# Patient Record
Sex: FEMALE | Race: WHITE | NOT HISPANIC OR LATINO | Employment: FULL TIME | URBAN - METROPOLITAN AREA
[De-identification: names, ages, dates, MRNs, and addresses within clinical notes are randomized per-mention and may not be internally consistent; named-entity substitution may affect disease eponyms.]

---

## 2017-04-05 ENCOUNTER — OFFICE VISIT - HEALTHEAST (OUTPATIENT)
Dept: FAMILY MEDICINE | Facility: CLINIC | Age: 37
End: 2017-04-05

## 2017-04-05 DIAGNOSIS — Z00.00 ROUTINE GENERAL MEDICAL EXAMINATION AT A HEALTH CARE FACILITY: ICD-10-CM

## 2017-04-05 DIAGNOSIS — M54.9 BACK PAIN: ICD-10-CM

## 2017-04-05 DIAGNOSIS — D48.9 NEOPLASM OF UNCERTAIN BEHAVIOR: ICD-10-CM

## 2017-04-05 LAB
CHOLEST SERPL-MCNC: 194 MG/DL
FASTING STATUS PATIENT QL REPORTED: YES
HDLC SERPL-MCNC: 44 MG/DL
LDLC SERPL CALC-MCNC: 126 MG/DL
TRIGL SERPL-MCNC: 122 MG/DL

## 2017-04-05 ASSESSMENT — MIFFLIN-ST. JEOR: SCORE: 1528.15

## 2017-04-06 ENCOUNTER — COMMUNICATION - HEALTHEAST (OUTPATIENT)
Dept: FAMILY MEDICINE | Facility: CLINIC | Age: 37
End: 2017-04-06

## 2017-04-10 ENCOUNTER — RECORDS - HEALTHEAST (OUTPATIENT)
Dept: ADMINISTRATIVE | Facility: OTHER | Age: 37
End: 2017-04-10

## 2017-04-10 ENCOUNTER — COMMUNICATION - HEALTHEAST (OUTPATIENT)
Dept: FAMILY MEDICINE | Facility: CLINIC | Age: 37
End: 2017-04-10

## 2017-04-10 LAB
HPV INTERPRETATION - HISTORICAL: ABNORMAL
HPV INTERPRETER - HISTORICAL: ABNORMAL

## 2017-04-12 ENCOUNTER — COMMUNICATION - HEALTHEAST (OUTPATIENT)
Dept: FAMILY MEDICINE | Facility: CLINIC | Age: 37
End: 2017-04-12

## 2017-04-12 DIAGNOSIS — R87.619 ABNORMAL PAP SMEAR OF CERVIX: ICD-10-CM

## 2017-04-12 LAB
BKR LAB AP ABNORMAL BLEEDING: NO
BKR LAB AP BIRTH CONTROL/HORMONES: ABNORMAL
BKR LAB AP CERVICAL APPEARANCE: NORMAL
BKR LAB AP GYN ADEQUACY: ABNORMAL
BKR LAB AP GYN INTERPRETATION: ABNORMAL
BKR LAB AP HPV REFLEX: ABNORMAL
BKR LAB AP LMP: ABNORMAL
BKR LAB AP PATIENT STATUS: ABNORMAL
BKR LAB AP PREVIOUS ABNORMAL: ABNORMAL
BKR LAB AP PREVIOUS NORMAL: ABNORMAL
HIGH RISK?: NO
INTERPRETING LAB: ABNORMAL
PATH REPORT.COMMENTS IMP SPEC: ABNORMAL
RESULT FLAG (HE HISTORICAL CONVERSION): ABNORMAL

## 2017-04-25 ENCOUNTER — RECORDS - HEALTHEAST (OUTPATIENT)
Dept: ADMINISTRATIVE | Facility: OTHER | Age: 37
End: 2017-04-25

## 2017-07-05 ENCOUNTER — COMMUNICATION - HEALTHEAST (OUTPATIENT)
Dept: FAMILY MEDICINE | Facility: CLINIC | Age: 37
End: 2017-07-05

## 2017-07-13 ENCOUNTER — OFFICE VISIT - HEALTHEAST (OUTPATIENT)
Dept: FAMILY MEDICINE | Facility: CLINIC | Age: 37
End: 2017-07-13

## 2017-07-13 DIAGNOSIS — M54.9 BACK PAIN: ICD-10-CM

## 2017-07-13 DIAGNOSIS — E66.3 OVERWEIGHT (BMI 25.0-29.9): ICD-10-CM

## 2017-07-13 DIAGNOSIS — F33.9 MAJOR DEPRESSIVE DISORDER, RECURRENT EPISODE (H): ICD-10-CM

## 2017-07-13 DIAGNOSIS — M25.551 HIP PAIN, ACUTE, RIGHT: ICD-10-CM

## 2017-07-13 DIAGNOSIS — Z72.0 TOBACCO ABUSE: ICD-10-CM

## 2017-07-13 RX ORDER — LORATADINE 10 MG/1
10 TABLET ORAL DAILY PRN
Status: SHIPPED | COMMUNITY
Start: 2010-05-18 | End: 2022-02-25

## 2017-07-13 ASSESSMENT — MIFFLIN-ST. JEOR: SCORE: 1531.32

## 2017-07-18 ENCOUNTER — OFFICE VISIT - HEALTHEAST (OUTPATIENT)
Dept: PHYSICAL THERAPY | Facility: REHABILITATION | Age: 37
End: 2017-07-18

## 2017-07-18 DIAGNOSIS — G89.29 CHRONIC RIGHT-SIDED LOW BACK PAIN WITHOUT SCIATICA: ICD-10-CM

## 2017-07-18 DIAGNOSIS — M62.81 GENERALIZED MUSCLE WEAKNESS: ICD-10-CM

## 2017-07-18 DIAGNOSIS — M25.551 PAIN OF RIGHT HIP JOINT: ICD-10-CM

## 2017-07-18 DIAGNOSIS — M54.50 CHRONIC RIGHT-SIDED LOW BACK PAIN WITHOUT SCIATICA: ICD-10-CM

## 2017-07-24 ENCOUNTER — OFFICE VISIT - HEALTHEAST (OUTPATIENT)
Dept: PHYSICAL THERAPY | Facility: REHABILITATION | Age: 37
End: 2017-07-24

## 2017-07-24 DIAGNOSIS — G89.29 CHRONIC RIGHT-SIDED LOW BACK PAIN WITHOUT SCIATICA: ICD-10-CM

## 2017-07-24 DIAGNOSIS — M54.50 CHRONIC RIGHT-SIDED LOW BACK PAIN WITHOUT SCIATICA: ICD-10-CM

## 2017-07-24 DIAGNOSIS — M62.81 GENERALIZED MUSCLE WEAKNESS: ICD-10-CM

## 2017-07-24 DIAGNOSIS — M25.551 PAIN OF RIGHT HIP JOINT: ICD-10-CM

## 2017-07-28 ENCOUNTER — OFFICE VISIT - HEALTHEAST (OUTPATIENT)
Dept: PHYSICAL THERAPY | Facility: REHABILITATION | Age: 37
End: 2017-07-28

## 2017-07-28 DIAGNOSIS — M62.81 GENERALIZED MUSCLE WEAKNESS: ICD-10-CM

## 2017-07-28 DIAGNOSIS — M25.551 PAIN OF RIGHT HIP JOINT: ICD-10-CM

## 2017-07-28 DIAGNOSIS — M54.50 CHRONIC RIGHT-SIDED LOW BACK PAIN WITHOUT SCIATICA: ICD-10-CM

## 2017-07-28 DIAGNOSIS — G89.29 CHRONIC RIGHT-SIDED LOW BACK PAIN WITHOUT SCIATICA: ICD-10-CM

## 2017-07-31 ENCOUNTER — OFFICE VISIT - HEALTHEAST (OUTPATIENT)
Dept: PHYSICAL THERAPY | Facility: REHABILITATION | Age: 37
End: 2017-07-31

## 2017-07-31 DIAGNOSIS — M25.551 PAIN OF RIGHT HIP JOINT: ICD-10-CM

## 2017-07-31 DIAGNOSIS — M62.81 GENERALIZED MUSCLE WEAKNESS: ICD-10-CM

## 2017-07-31 DIAGNOSIS — M54.50 CHRONIC RIGHT-SIDED LOW BACK PAIN WITHOUT SCIATICA: ICD-10-CM

## 2017-07-31 DIAGNOSIS — G89.29 CHRONIC RIGHT-SIDED LOW BACK PAIN WITHOUT SCIATICA: ICD-10-CM

## 2017-09-29 ENCOUNTER — OFFICE VISIT - HEALTHEAST (OUTPATIENT)
Dept: FAMILY MEDICINE | Facility: CLINIC | Age: 37
End: 2017-09-29

## 2017-09-29 DIAGNOSIS — J40 BRONCHITIS: ICD-10-CM

## 2017-09-29 DIAGNOSIS — R05.9 COUGH: ICD-10-CM

## 2018-03-01 ENCOUNTER — COMMUNICATION - HEALTHEAST (OUTPATIENT)
Dept: FAMILY MEDICINE | Facility: CLINIC | Age: 38
End: 2018-03-01

## 2018-03-01 DIAGNOSIS — F33.9 MAJOR DEPRESSIVE DISORDER, RECURRENT EPISODE (H): ICD-10-CM

## 2018-05-11 ENCOUNTER — OFFICE VISIT - HEALTHEAST (OUTPATIENT)
Dept: FAMILY MEDICINE | Facility: CLINIC | Age: 38
End: 2018-05-11

## 2018-05-11 DIAGNOSIS — F33.9 MAJOR DEPRESSIVE DISORDER, RECURRENT EPISODE (H): ICD-10-CM

## 2018-05-11 DIAGNOSIS — M54.9 BACK PAIN: ICD-10-CM

## 2018-05-11 DIAGNOSIS — J30.9 ALLERGIC RHINITIS: ICD-10-CM

## 2018-05-11 DIAGNOSIS — Z23 NEED FOR HEPATITIS B VACCINATION: ICD-10-CM

## 2018-05-11 DIAGNOSIS — Z00.00 ROUTINE GENERAL MEDICAL EXAMINATION AT A HEALTH CARE FACILITY: ICD-10-CM

## 2018-05-11 DIAGNOSIS — Z23 NEED FOR HEPATITIS A VACCINATION: ICD-10-CM

## 2018-05-11 DIAGNOSIS — F41.1 ANXIETY STATE: ICD-10-CM

## 2018-05-11 DIAGNOSIS — Z23 NEED FOR TDAP VACCINATION: ICD-10-CM

## 2018-05-11 LAB
ALBUMIN UR-MCNC: NEGATIVE MG/DL
APPEARANCE UR: CLEAR
BILIRUB UR QL STRIP: NEGATIVE
CHOLEST SERPL-MCNC: 195 MG/DL
COLOR UR AUTO: YELLOW
FASTING STATUS PATIENT QL REPORTED: NO
GLUCOSE UR STRIP-MCNC: NEGATIVE MG/DL
HDLC SERPL-MCNC: 45 MG/DL
HGB BLD-MCNC: 12.7 G/DL (ref 12–16)
HGB UR QL STRIP: NEGATIVE
KETONES UR STRIP-MCNC: NEGATIVE MG/DL
LDLC SERPL CALC-MCNC: 117 MG/DL
LEUKOCYTE ESTERASE UR QL STRIP: NEGATIVE
NITRATE UR QL: NEGATIVE
PH UR STRIP: 7 [PH] (ref 5–8)
SP GR UR STRIP: 1.02 (ref 1–1.03)
TRIGL SERPL-MCNC: 165 MG/DL
UROBILINOGEN UR STRIP-ACNC: NORMAL

## 2018-05-11 ASSESSMENT — MIFFLIN-ST. JEOR: SCORE: 1508.64

## 2018-05-14 ENCOUNTER — COMMUNICATION - HEALTHEAST (OUTPATIENT)
Dept: FAMILY MEDICINE | Facility: CLINIC | Age: 38
End: 2018-05-14

## 2018-05-25 ENCOUNTER — HOSPITAL ENCOUNTER (OUTPATIENT)
Dept: MAMMOGRAPHY | Facility: CLINIC | Age: 38
Discharge: HOME | End: 2018-05-25
Attending: FAMILY MEDICINE

## 2018-05-25 ENCOUNTER — COMMUNICATION - HEALTHEAST (OUTPATIENT)
Dept: TELEHEALTH | Facility: CLINIC | Age: 38
End: 2018-05-25

## 2018-05-25 DIAGNOSIS — Z12.31 VISIT FOR SCREENING MAMMOGRAM: ICD-10-CM

## 2018-07-09 ENCOUNTER — AMBULATORY - HEALTHEAST (OUTPATIENT)
Dept: FAMILY MEDICINE | Facility: CLINIC | Age: 38
End: 2018-07-09

## 2018-07-09 DIAGNOSIS — Z23 NEED FOR HEPATITIS B VACCINATION: ICD-10-CM

## 2018-07-10 ENCOUNTER — AMBULATORY - HEALTHEAST (OUTPATIENT)
Dept: NURSING | Facility: CLINIC | Age: 38
End: 2018-07-10

## 2018-07-10 DIAGNOSIS — Z23 NEED FOR HEPATITIS B VACCINATION: ICD-10-CM

## 2018-07-20 ENCOUNTER — COMMUNICATION - HEALTHEAST (OUTPATIENT)
Dept: FAMILY MEDICINE | Facility: CLINIC | Age: 38
End: 2018-07-20

## 2018-07-20 DIAGNOSIS — F33.9 MAJOR DEPRESSIVE DISORDER, RECURRENT EPISODE (H): ICD-10-CM

## 2018-11-30 ENCOUNTER — OFFICE VISIT - HEALTHEAST (OUTPATIENT)
Dept: FAMILY MEDICINE | Facility: CLINIC | Age: 38
End: 2018-11-30

## 2018-11-30 DIAGNOSIS — Z23 NEEDS FLU SHOT: ICD-10-CM

## 2018-11-30 DIAGNOSIS — F33.1 MODERATE EPISODE OF RECURRENT MAJOR DEPRESSIVE DISORDER (H): ICD-10-CM

## 2018-11-30 DIAGNOSIS — Z23 NEED FOR HEPATITIS B VACCINATION: ICD-10-CM

## 2018-11-30 DIAGNOSIS — Z23 NEED FOR HEPATITIS A IMMUNIZATION: ICD-10-CM

## 2019-01-04 ENCOUNTER — OFFICE VISIT - HEALTHEAST (OUTPATIENT)
Dept: FAMILY MEDICINE | Facility: CLINIC | Age: 39
End: 2019-01-04

## 2019-01-04 ENCOUNTER — HOSPITAL ENCOUNTER (OUTPATIENT)
Dept: LAB | Age: 39
Setting detail: SPECIMEN
Discharge: HOME | End: 2019-01-04

## 2019-01-04 DIAGNOSIS — N39.0 URINARY TRACT INFECTION: ICD-10-CM

## 2019-01-04 LAB
ALBUMIN UR-MCNC: ABNORMAL MG/DL
AMORPH CRY #/AREA URNS HPF: ABNORMAL /[HPF]
APPEARANCE UR: ABNORMAL
BACTERIA #/AREA URNS HPF: ABNORMAL HPF
BILIRUB UR QL STRIP: NEGATIVE
COLOR UR AUTO: YELLOW
GLUCOSE UR STRIP-MCNC: NEGATIVE MG/DL
HGB UR QL STRIP: NEGATIVE
KETONES UR STRIP-MCNC: NEGATIVE MG/DL
LEUKOCYTE ESTERASE UR QL STRIP: NEGATIVE
MUCOUS THREADS #/AREA URNS LPF: ABNORMAL LPF
NITRATE UR QL: NEGATIVE
PH UR STRIP: 5.5 [PH] (ref 4.5–8)
RBC #/AREA URNS AUTO: ABNORMAL HPF
SP GR UR STRIP: 1.02 (ref 1–1.03)
SQUAMOUS #/AREA URNS AUTO: ABNORMAL LPF
UROBILINOGEN UR STRIP-ACNC: ABNORMAL
WBC #/AREA URNS AUTO: ABNORMAL HPF

## 2019-01-06 LAB — BACTERIA SPEC CULT: ABNORMAL

## 2019-05-24 ENCOUNTER — COMMUNICATION - HEALTHEAST (OUTPATIENT)
Dept: FAMILY MEDICINE | Facility: CLINIC | Age: 39
End: 2019-05-24

## 2019-05-24 DIAGNOSIS — J30.2 SEASONAL ALLERGIC RHINITIS, UNSPECIFIED TRIGGER: ICD-10-CM

## 2019-05-31 ENCOUNTER — OFFICE VISIT - HEALTHEAST (OUTPATIENT)
Dept: FAMILY MEDICINE | Facility: CLINIC | Age: 39
End: 2019-05-31

## 2019-05-31 DIAGNOSIS — F41.1 ANXIETY STATE: ICD-10-CM

## 2019-05-31 DIAGNOSIS — J30.2 SEASONAL ALLERGIC RHINITIS, UNSPECIFIED TRIGGER: ICD-10-CM

## 2019-05-31 DIAGNOSIS — F33.1 MODERATE EPISODE OF RECURRENT MAJOR DEPRESSIVE DISORDER (H): ICD-10-CM

## 2019-05-31 ASSESSMENT — MIFFLIN-ST. JEOR: SCORE: 1526.78

## 2019-08-09 ENCOUNTER — COMMUNICATION - HEALTHEAST (OUTPATIENT)
Dept: FAMILY MEDICINE | Facility: CLINIC | Age: 39
End: 2019-08-09

## 2019-08-09 DIAGNOSIS — J30.2 SEASONAL ALLERGIC RHINITIS, UNSPECIFIED TRIGGER: ICD-10-CM

## 2019-08-17 ENCOUNTER — OFFICE VISIT - HEALTHEAST (OUTPATIENT)
Dept: FAMILY MEDICINE | Facility: CLINIC | Age: 39
End: 2019-08-17

## 2019-08-17 DIAGNOSIS — R06.2 WHEEZING: ICD-10-CM

## 2019-08-17 DIAGNOSIS — H92.01 RIGHT EAR PAIN: ICD-10-CM

## 2019-08-17 DIAGNOSIS — H61.23 BILATERAL IMPACTED CERUMEN: ICD-10-CM

## 2019-11-24 ENCOUNTER — COMMUNICATION - HEALTHEAST (OUTPATIENT)
Dept: FAMILY MEDICINE | Facility: CLINIC | Age: 39
End: 2019-11-24

## 2019-11-24 DIAGNOSIS — F33.1 MODERATE EPISODE OF RECURRENT MAJOR DEPRESSIVE DISORDER (H): ICD-10-CM

## 2019-11-24 DIAGNOSIS — F41.1 ANXIETY STATE: ICD-10-CM

## 2020-05-12 ENCOUNTER — COMMUNICATION - HEALTHEAST (OUTPATIENT)
Dept: FAMILY MEDICINE | Facility: CLINIC | Age: 40
End: 2020-05-12

## 2020-05-12 DIAGNOSIS — F33.1 MODERATE EPISODE OF RECURRENT MAJOR DEPRESSIVE DISORDER (H): ICD-10-CM

## 2020-05-12 DIAGNOSIS — F41.1 ANXIETY STATE: ICD-10-CM

## 2020-05-14 ENCOUNTER — COMMUNICATION - HEALTHEAST (OUTPATIENT)
Dept: FAMILY MEDICINE | Facility: CLINIC | Age: 40
End: 2020-05-14

## 2020-05-14 DIAGNOSIS — J30.2 SEASONAL ALLERGIC RHINITIS, UNSPECIFIED TRIGGER: ICD-10-CM

## 2020-06-05 ENCOUNTER — OFFICE VISIT - HEALTHEAST (OUTPATIENT)
Dept: FAMILY MEDICINE | Facility: CLINIC | Age: 40
End: 2020-06-05

## 2020-06-05 DIAGNOSIS — F33.1 MODERATE EPISODE OF RECURRENT MAJOR DEPRESSIVE DISORDER (H): ICD-10-CM

## 2020-06-05 DIAGNOSIS — F41.1 ANXIETY STATE: ICD-10-CM

## 2020-06-05 ASSESSMENT — ANXIETY QUESTIONNAIRES
3. WORRYING TOO MUCH ABOUT DIFFERENT THINGS: NEARLY EVERY DAY
7. FEELING AFRAID AS IF SOMETHING AWFUL MIGHT HAPPEN: NEARLY EVERY DAY
IF YOU CHECKED OFF ANY PROBLEMS ON THIS QUESTIONNAIRE, HOW DIFFICULT HAVE THESE PROBLEMS MADE IT FOR YOU TO DO YOUR WORK, TAKE CARE OF THINGS AT HOME, OR GET ALONG WITH OTHER PEOPLE: SOMEWHAT DIFFICULT
1. FEELING NERVOUS, ANXIOUS, OR ON EDGE: SEVERAL DAYS
5. BEING SO RESTLESS THAT IT IS HARD TO SIT STILL: NEARLY EVERY DAY
2. NOT BEING ABLE TO STOP OR CONTROL WORRYING: NEARLY EVERY DAY
GAD7 TOTAL SCORE: 19
6. BECOMING EASILY ANNOYED OR IRRITABLE: NEARLY EVERY DAY
4. TROUBLE RELAXING: NEARLY EVERY DAY

## 2020-06-05 ASSESSMENT — PATIENT HEALTH QUESTIONNAIRE - PHQ9: SUM OF ALL RESPONSES TO PHQ QUESTIONS 1-9: 9

## 2020-08-10 ENCOUNTER — COMMUNICATION - HEALTHEAST (OUTPATIENT)
Dept: FAMILY MEDICINE | Facility: CLINIC | Age: 40
End: 2020-08-10

## 2020-08-10 DIAGNOSIS — F33.1 MODERATE EPISODE OF RECURRENT MAJOR DEPRESSIVE DISORDER (H): ICD-10-CM

## 2020-08-10 DIAGNOSIS — F41.1 ANXIETY STATE: ICD-10-CM

## 2020-12-22 ENCOUNTER — COMMUNICATION - HEALTHEAST (OUTPATIENT)
Dept: FAMILY MEDICINE | Facility: CLINIC | Age: 40
End: 2020-12-22

## 2020-12-22 DIAGNOSIS — J30.2 SEASONAL ALLERGIC RHINITIS, UNSPECIFIED TRIGGER: ICD-10-CM

## 2020-12-25 RX ORDER — ALBUTEROL SULFATE 90 UG/1
1-2 AEROSOL, METERED RESPIRATORY (INHALATION) EVERY 6 HOURS PRN
Qty: 1 INHALER | Refills: 2 | Status: SHIPPED | OUTPATIENT
Start: 2020-12-25 | End: 2022-02-25

## 2021-01-21 ENCOUNTER — COMMUNICATION - HEALTHEAST (OUTPATIENT)
Dept: FAMILY MEDICINE | Facility: CLINIC | Age: 41
End: 2021-01-21

## 2021-01-21 DIAGNOSIS — F33.1 MODERATE EPISODE OF RECURRENT MAJOR DEPRESSIVE DISORDER (H): ICD-10-CM

## 2021-01-21 DIAGNOSIS — F41.1 ANXIETY STATE: ICD-10-CM

## 2021-05-25 ENCOUNTER — RECORDS - HEALTHEAST (OUTPATIENT)
Dept: ADMINISTRATIVE | Facility: CLINIC | Age: 41
End: 2021-05-25

## 2021-05-26 ASSESSMENT — PATIENT HEALTH QUESTIONNAIRE - PHQ9: SUM OF ALL RESPONSES TO PHQ QUESTIONS 1-9: 9

## 2021-05-28 ASSESSMENT — ANXIETY QUESTIONNAIRES: GAD7 TOTAL SCORE: 19

## 2021-05-29 ENCOUNTER — HEALTH MAINTENANCE LETTER (OUTPATIENT)
Age: 41
End: 2021-05-29

## 2021-05-29 NOTE — TELEPHONE ENCOUNTER
Prescription sent to pharmacy electronically. Please call and let patient know this should be available for  at the pharmacy.

## 2021-05-29 NOTE — TELEPHONE ENCOUNTER
Medication Request  Medication name:   albuterol (PROVENTIL HFA;VENTOLIN HFA) 90 mcg/actuation inhaler (Discontinued) 1 Inhaler 0 7/15/2016 5/11/2018 No   Sig - Route: Inhale 2 puffs every 6 (six) hours as needed for wheezing. Every 4 to 6 hours as needed. - Inhalation   Reason for Discontinue: Therapy completed   E-Prescribing Status: Receipt confirmed by pharmacy (7/15/2016  3:08 PM CDT)       [facility] [address] [place] [address] [address] AT [facility] [address]  Reason for request: Patient is out of this medication and was scheduled with Marissa Rico MD on 5/21/19 but this was rescheduled until next Friday. Patient stated she is having breathing issues due to allergies.   When did you use medication last?:  Unsure needs it mainly in allergy season.  Patient offered appointment:  Patient is scheduled on Friday was scheduled on 5/21/19 but provider had to reschedule  Okay to leave a detailed message: yes
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
## 2021-05-30 VITALS — BODY MASS INDEX: 29.08 KG/M2 | WEIGHT: 185.3 LBS | HEIGHT: 67 IN

## 2021-05-31 VITALS — WEIGHT: 175.06 LBS | BODY MASS INDEX: 27.42 KG/M2

## 2021-05-31 VITALS — HEIGHT: 67 IN | WEIGHT: 186 LBS | BODY MASS INDEX: 29.19 KG/M2

## 2021-05-31 NOTE — PROGRESS NOTES
Chief Complaint   Patient presents with     Cough     x1 week      Ear Pain     RT ear pain for a few days        HPI:  Soraida Rousseau is a 39 y.o. female who presents today complaining of cough x 1 week. Right ear pain worse with coughing. She has been taking Dayquil and Nyquil, but she has not had any today or yesterday.  Patient is unsure if she is had any fevers.  She has been feeling very hot, but denies any chills or sweats.  At the beginning of her head cold she was experiencing nasal congestion, but that is since resolved.  Cough is worse when she lays down and she experiences wheezing at night.  She has been using albuterol inhaler as needed.  She has a history of environmental allergies, but she states this does not feel similar.  She denies any GI or skin complaints, sore throat, or ear discharge.    History obtained from the patient.    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  Allergic Rhinitis  Headache  Neck Pain  Lower Back Pain  Abnormal Weight Gain  Major Depression, Recurrent  Anxiety      Past Medical History:   Diagnosis Date     Abnormal Pap smear of cervix 04/2017    LSIL, colpo done, essentially benign, pap done 2/18 was ok so now back to yearly paps     Allergic rhinitis     seasonal allergies, takes claritin     Anxiety      Bronchospasm     use inhaler prn     Chronic neck and back pain      Headache      Legally blind in right eye, as defined in [place]      Low back pain      Major depressive disorder, recurrent episode, unspecified      Miscarriage      Normal delivery     x2       Social History     Tobacco Use     Smoking status: Current Some Day Smoker     Packs/day: 0.75     Years: 20.00     Pack years: 15.00     Smokeless tobacco: Never Used   Substance Use Topics     Alcohol use: Yes     Alcohol/week: 0.6 oz     Types: 1 Standard drinks or equivalent per week     Comment: once a month       Review of Systems   Constitutional: Negative for fever.        (+)  feeling hot   HENT: Positive for congestion (resolved) and ear pain (right, only when coughing). Negative for ear discharge and sore throat.    Respiratory: Positive for cough and wheezing (worse with laying down).    Gastrointestinal: Negative.    Skin: Negative for rash.   Allergic/Immunologic: Positive for environmental allergies (spring and fall).       Vitals:    08/17/19 0849   BP: 111/72   Patient Site: Right Arm   Patient Position: Sitting   Cuff Size: Adult Regular   Pulse: 81   Resp: 20   Temp: 98.3  F (36.8  C)   TempSrc: Oral   SpO2: 97%   Weight: 181 lb (82.1 kg)       Physical Exam   Constitutional: She appears well-developed and well-nourished. No distress.   HENT:   Head: Normocephalic and atraumatic.   Right Ear: External ear normal. Tympanic membrane is not perforated, not erythematous and not retracted.   Left Ear: External ear normal. Tympanic membrane is not perforated, not erythematous and not retracted.   Bilateral cerumen impaction present.  Successfully removed with irrigation by the medical assistant.  No signs of otitis media present after irrigation.   Eyes: Conjunctivae are normal. Right eye exhibits no discharge. Left eye exhibits no discharge.   Cardiovascular: Normal rate, regular rhythm and normal heart sounds.   Pulmonary/Chest: Effort normal and breath sounds normal. No stridor. No respiratory distress. She has no wheezes. She has no rhonchi. She has no rales.   Intermittent raspy/wheezing cough.  No wheezes noted on quite lung exam.   Skin: She is not diaphoretic.   Psychiatric: She has a normal mood and affect. Her behavior is normal. Judgment and thought content normal.       Clinical Decision Making:  Recommend we treat this as bronchitis as the patient has been wheezy with coughing that is causing ear pain.  Patient was started on prednisone and Tessalon Perles for cough relief.  See patient instructions.  At the end of the encounter, I discussed results, diagnosis,  medications. Discussed red flags for immediate return to clinic/ER, as well as indications for follow up if no improvement. Patient understood and agreed to plan. Patient was stable for discharge.    1. Bilateral impacted cerumen     2. Right ear pain           There are no Patient Instructions on file for this visit.

## 2021-05-31 NOTE — TELEPHONE ENCOUNTER
Refill Request  Did you contact pharmacy: No  Medication name:   Requested Prescriptions     Pending Prescriptions Disp Refills     albuterol (PROAIR HFA;PROVENTIL HFA;VENTOLIN HFA) 90 mcg/actuation inhaler 1 Inhaler 0     Sig: Inhale 1-2 puffs every 6 (six) hours as needed for wheezing.     Who prescribed the medication: Marissa Rico MD    Pharmacy Name and Location: [facility]  Is patient out of medication: Yes  Patient notified refills processed in 72 hours:  yes  Okay to leave a detailed message: yes    Patient is getting a cold, would like to have inhaler on hand.

## 2021-05-31 NOTE — PATIENT INSTRUCTIONS - HE
There were no signs of pneumonia on your lung exam.    Your symptoms are most likely due to acute bronchitis.  This is inflammation of the tubes leading into the lungs, most often due to a viral infection and an antibiotic will not help this.    I have prescribed Prednisone to help with the cough/wheezing. Best to take this medication in the mornings.    May take Tessalon Perles as needed for cough.     Please monitor symptoms carefully.  If developing chest pain, shortness of breath, fever, coughing up blood, extreme fatigue, or any other new, concerning symptoms, come back to clinic or go to ER immediately.  Otherwise, if no improvement in symptoms in one week, follow-up with your primary care provider.      For future ear cleaning may try Debrox solution.

## 2021-05-31 NOTE — TELEPHONE ENCOUNTER
Refill Approved    Rx renewed per Medication Renewal Policy. Medication was last renewed on 5/31/2019.    Jerel Lozoya, [facility] Triage/Med Refill 8/9/2019     Requested Prescriptions   Pending Prescriptions Disp Refills     albuterol (PROAIR HFA;PROVENTIL HFA;VENTOLIN HFA) 90 mcg/actuation inhaler 1 Inhaler 0     Sig: Inhale 1-2 puffs every 6 (six) hours as needed for wheezing.       Albuterol/Levalbuterol Refill Protocol Passed - 8/9/2019 11:30 AM        Passed - PCP or prescribing provider visit in last year     Last office visit with prescriber/PCP: 5/31/2019 Marissa Rico MD OR same dept: 5/31/2019 Marissa Rico MD OR same specialty: 5/31/2019 Marissa Rico MD Last physical: 5/11/2018       Next appt within 3 mo: Visit date not found  Next physical within 3 mo: Visit date not found  Prescriber OR PCP: Marissa Rico MD  Last diagnosis associated with med order: 1. Seasonal allergic rhinitis, unspecified trigger  - albuterol (PROAIR HFA;PROVENTIL HFA;VENTOLIN HFA) 90 mcg/actuation inhaler; Inhale 1-2 puffs every 6 (six) hours as needed for wheezing.  Dispense: 1 Inhaler; Refill: 0    If protocol passes may refill for 6 months if within 3 months of last provider visit (or a total of 9 months). If patient requesting >1 inhaler per month refill x 6 months and have patient make appointment with provider.

## 2021-06-01 VITALS — HEIGHT: 67 IN | WEIGHT: 181 LBS | BODY MASS INDEX: 28.41 KG/M2

## 2021-06-02 VITALS — BODY MASS INDEX: 27.72 KG/M2 | WEIGHT: 177 LBS

## 2021-06-02 VITALS — WEIGHT: 181.19 LBS | BODY MASS INDEX: 28.38 KG/M2

## 2021-06-03 VITALS — HEIGHT: 67 IN | BODY MASS INDEX: 29.03 KG/M2 | WEIGHT: 185 LBS

## 2021-06-03 VITALS — WEIGHT: 181 LBS | BODY MASS INDEX: 28.35 KG/M2

## 2021-06-03 NOTE — TELEPHONE ENCOUNTER
Refill Approved    Rx renewed per Medication Renewal Policy. Medication was last renewed on 5/31/19.    Milagros Hairston, [facility] Triage/Med Refill 11/24/2019     Requested Prescriptions   Pending Prescriptions Disp Refills     FLUoxetine (PROZAC) 20 MG capsule [Pharmacy Med Name: FLUOXETINE 20MG CAPSULES] 90 capsule 0     Sig: TAKE 1 CAPSULE(20 MG) BY MOUTH DAILY       SSRI Refill Protocol  Passed - 11/24/2019  9:47 AM        Passed - PCP or prescribing provider visit in last year     Last office visit with prescriber/PCP: 5/31/2019 Marissa Rico MD OR same dept: 5/31/2019 Marissa Rico MD OR same specialty: 5/31/2019 Marissa Rico MD  Last physical: 5/11/2018 Last MTM visit: Visit date not found   Next visit within 3 mo: Visit date not found  Next physical within 3 mo: Visit date not found  Prescriber OR PCP: Marissa Rico MD  Last diagnosis associated with med order: 1. Moderate episode of recurrent major depressive disorder (H)  - FLUoxetine (PROZAC) 20 MG capsule [Pharmacy Med Name: FLUOXETINE 20MG CAPSULES]; TAKE 1 CAPSULE(20 MG) BY MOUTH DAILY  Dispense: 90 capsule; Refill: 0    2. Anxiety  - FLUoxetine (PROZAC) 20 MG capsule [Pharmacy Med Name: FLUOXETINE 20MG CAPSULES]; TAKE 1 CAPSULE(20 MG) BY MOUTH DAILY  Dispense: 90 capsule; Refill: 0    If protocol passes may refill for 12 months if within 3 months of last provider visit (or a total of 15 months).

## 2021-06-08 NOTE — TELEPHONE ENCOUNTER
Due to be seen    Rx renewed per Medication Renewal Policy. Medication was last renewed on 11/24/19.    Lorraine Mata, [facility] Triage/Med Refill 5/13/2020     Requested Prescriptions   Pending Prescriptions Disp Refills     FLUoxetine (PROZAC) 20 MG capsule 90 capsule 0     Sig: Take 1 capsule (20 mg total) by mouth daily.       SSRI Refill Protocol  Passed - 5/12/2020  7:06 AM        Passed - PCP or prescribing provider visit in last year     Last office visit with prescriber/PCP: 5/31/2019 Marissa Rico MD OR same dept: 5/31/2019 Marissa Rico MD OR same specialty: 5/31/2019 Marissa Rico MD  Last physical: 5/11/2018 Last MTM visit: Visit date not found   Next visit within 3 mo: Visit date not found  Next physical within 3 mo: Visit date not found  Prescriber OR PCP: Marissa Rico MD  Last diagnosis associated with med order: 1. Moderate episode of recurrent major depressive disorder (H)  - FLUoxetine (PROZAC) 20 MG capsule; Take 1 capsule (20 mg total) by mouth daily.  Dispense: 90 capsule; Refill: 0    2. Anxiety  - FLUoxetine (PROZAC) 20 MG capsule; Take 1 capsule (20 mg total) by mouth daily.  Dispense: 90 capsule; Refill: 0    If protocol passes may refill for 12 months if within 3 months of last provider visit (or a total of 15 months).

## 2021-06-08 NOTE — PROGRESS NOTES
"Soraida Rousseau is a 40 y.o. female who is being evaluated via a billable video visit.      The patient has been notified of following:     \"This video visit will be conducted via a call between you and your physician/provider. We have found that certain health care needs can be provided without the need for an in-person physical exam.  This service lets us provide the care you need with a video conversation.  If a prescription is necessary we can send it directly to your pharmacy.  If lab work is needed we can place an order for that and you can then stop by our lab to have the test done at a later time.    Video visits are billed at different rates depending on your insurance coverage. Please reach out to your insurance provider with any questions.    If during the course of the call the physician/provider feels a video visit is not appropriate, you will not be charged for this service.\"    Patient has given verbal consent to a Video visit? Yes    Patient would like to receive their AVS by AVS Preference: Karla.    Patient would like the video invitation sent by: Text to cell phone: [phone]    Will anyone else be joining your video visit? No        Video Start Time: 1040    Additional provider notes:   Patient is seen today via telephone visit rather than office visit due to the COVID 19 outbreak pandemic.  This is done for the protection of patient from potential exposure to this virus by coming to the clinic.    PROGRESS NOTE   6/5/2020    SUBJECTIVE:  Soraida Rousseau is a 40 y.o. female  who presents for   Chief Complaint   Patient presents with     Depression     Anxiety     Patient is being evaluated today for her depression and anxiety.  She continues on Prozac 20 mg a day.  She admits that she went off of it and had been off of it for about 6 months or so but started again on 5/13/2020 because she felt like she was getting too edgy and too uptight.  She notes that recently she has been having a lot " more stress and that is probably what is because these issues.  She was doing really well and she continues to do well overall.  She notes that her son which was a source of a lot of her stresses is doing so much better.  He was in inpatient treatment about a year ago and she does not think that he is completely sober but he is doing so much better.  He still living at home and but his attitude is changed and he is a much better person now when she is quite grateful for that.  Her grandmother just recently went into hospice and she is come to peace with that as well.  Is obviously very difficult for her and she notes this can be difficult when grandmother dies however she has come to peace with that as well.  She did get a new job in January after she was laid off.  Of course the layoff was a problem for her but now she took a majors pay cut but she works for the [employer] in the [employer] department and is much less stressed with her job.  She is quite thankful for the fact that she is less stressed and is overall having much better life work balance.  She really has not noticed much difference in terms of the Prozac since she just started it a couple of weeks ago however she knows that it is been helpful in the past and she would like to continue on that.  She does need a refill of her medicine.  She is not suicidal or homicidal.  Please see PHQ 9 and NATALIE which are both completed in their entirety today.  She is tolerating her medication without any problems.    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 1 Inhaler 0      FLUoxetine (PROZAC) 20 MG capsule Take 1 capsule (20 mg total) by mouth daily. 60 capsule 0     loratadine (CLARITIN) 10 mg tablet Take 10 mg by mouth.       naproxen sodium (ALEVE) 220 MG tablet Take 220 mg by mouth every evening.       No current facility-administered medications for this visit.        Allergies   Allergen Reactions     Nitrofurantoin Monohyd/M-Cryst Wheezing and Nausea Only       Past Medical History:   Diagnosis Date     Abnormal Pap smear of cervix 2017    LSIL, colpo done, essentially benign, pap done  was ok so now back to yearly paps     Allergic rhinitis     seasonal allergies, takes claritin     Anxiety      Bronchospasm     use inhaler prn     Chronic neck and back pain      Headache      Legally blind in right eye, as defined in [place]      Low back pain      Major depressive disorder, recurrent episode, unspecified      Miscarriage      Normal delivery     x2       Past Surgical History:   Procedure Laterality Date     APPENDECTOMY       CERVICAL LAMINECTOMY  2006    left C5-6 hemilaminectomy, microforaminotomy     COLPOSCOPY  2017    LSIL, colpo done, essentially benign, repap done  was ok, so back to yearly paps with me     elective       x4     tubal removal  2016    Dr Lopez       Social History     Tobacco Use   Smoking Status Current Some Day Smoker     Packs/day: 0.75     Years: 20.00     Pack years: 15.00   Smokeless Tobacco Never Used       OBJECTIVE:     There were no vitals taken for this visit.    Physical Exam:  GENERAL APPEARANCE: A&A, NAD, well hydrated, well nourished  SKIN:  Normal skin turgor, no lesions/rashes   EXTREMITY: no swelling noted.  Full range of motion of all 4 extremities.   NEURO: no gross deficits   PSYCHIATRIC;  Mood appropriate, memory intact  A&O x3, thought processes congruent, non-tangential. No hallucinations/delusions. Insight/judgment: intact. Denies suicidal/homicidal ideations.    Little interest or pleasure in doing  things: Several days  Feeling down, depressed, or hopeless: Not at all  Trouble falling or staying asleep, or sleeping too much: Several days  Feeling tired or having little energy: Nearly every day  Poor appetite or overeating: Several days  Feeling bad about yourself - or that you are a failure or have let yourself or your family down: Not at all  Trouble concentrating on things, such as reading the newspaper or watching television: Several days  Moving or speaking so slowly that other people could have noticed. Or the opposite - being so fidgety or restless that you have been moving around a lot more than usual: More than half the days  Thoughts that you would be better off dead, or of hurting yourself in some way: Not at all  PHQ-9 Total Score: 9  If you checked off any problems, how difficult have these problems made it for you to do your work, take care of things at home, or get along with other people?: Not difficult at all    Total NATALIE 7 Score       6/5/2020             NATALIE 7 Total Score:  19              ASSESSMENT/PLAN:     Moderate episode of recurrent major depressive disorder (H) [F33.1]      1. Moderate episode of recurrent major depressive disorder (H)  - FLUoxetine (PROZAC) 20 MG capsule; Take 1 capsule (20 mg total) by mouth daily.  Dispense: 60 capsule; Refill: 0    2. Anxiety  - FLUoxetine (PROZAC) 20 MG capsule; Take 1 capsule (20 mg total) by mouth daily.  Dispense: 60 capsule; Refill: 0      Patient is being evaluated today because she needs a refill of her Prozac.  She was on Prozac 20 mg a day for quite some time and she went off of it probably 6 months ago or more because she thought she was doing well.  She restarted it a few weeks ago because she noticed that she was more energy and more appetite all the time.  These are typical symptoms that she has been her depression and anxiety get worse.  Since starting the medication she really has not noticed a significant difference in terms of her  symptoms but she knows that she will.  Prozac has been very helpful for her in the past.  She would like to refill her medication at the 20 mg tablets dosage because that is been helpful for her in the past.  We did discuss the fact that it takes somewhere between 4 and 8 weeks for medication to kick in.  At this point she certainly has not been on it for that long.  Refill of her Prozac was sent in today.  I did give her a 2-month supply.  Like to see her back in about 6 weeks or so to see how she is doing.  By then we definitely should see that medication is working for her.  She is not suicidal or homicidal at this time.  Please see PHQ 9 and NATALIE which are both completed in their entirety today.  If she has additional problems or concerns or has any changes in her symptoms between now and then she should let me know but otherwise we will see her in about 6 weeks or so.  She is wondering about getting some spots evaluated on her face and her chest as well as some skin tags removed.  She is wanting about going to see a dermatologist for that.  At this point she would like to not do that yet because of the pandemic but probably will want to do that in the future.  We will keep that in mind when she comes in in about 6 weeks and we can evaluate and sent her to the dermatologist at that time if it is appropriate.  All of her questions were answered today.  If she has additional questions or concerns should certainly let me know.      Video-Visit Details    Type of service:  Video Visit    Video End Time (time video stopped): 1050  Originating Location (pt. Location): Home    Distant Location (provider location):  [facility]     Platform used for Video Visit: Concepcion Rico MD

## 2021-06-08 NOTE — TELEPHONE ENCOUNTER
Call patient and let her know that I did send in a prescription for 30-day supply of her medication however she is extremely overdue for follow-up.    Please assist patient with scheduling a virtual visit for follow-up of her depression and anxiety sometime within the next 30 days.
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
## 2021-06-10 NOTE — TELEPHONE ENCOUNTER
Please call her and let her know that we did refill her medication for 30 days however she is due for follow up prior to any further refills.     Please assist patient with scheduling a followup appointment sometime in the next 30 days.

## 2021-06-10 NOTE — TELEPHONE ENCOUNTER
Pt states she is doing well on medications, had lots of family issues going on a few months ago and had time to regroup and settle down/ pt will sched with you in dec/ jan

## 2021-06-10 NOTE — TELEPHONE ENCOUNTER
Great  Thanks   I just sent a refill of 30 days of prozac to the pharmacy a few days ago and thus it is too early for another refill.  When she needs a refill she should call the pharmacy and we will refill it until December or January at which time patient needs to be seen.

## 2021-06-10 NOTE — TELEPHONE ENCOUNTER
Pt states that she as seen on 6/5/2020. She usually is only seen once a year for this medication. She has a high deductible and would like to avoid coming in for something she was just seen for. Please advise.

## 2021-06-10 NOTE — TELEPHONE ENCOUNTER
When patient was seen on 6/5/2020 virtually I gave her a refill of the prozac however she noted that she was not feeling much different than when she started it a few weeks earlier and thus I had recommended she come back in to clinic for reevaluation in 6 weeks or so.     Is she feeling better now? Does she feel like the medication is effective? Is she having any side effects to the medication?    If she is now doing well, we can refill her prescriptions for the next 6 months but we should see her in December or January for recheck of these medications.

## 2021-06-11 NOTE — PROGRESS NOTES
[facility]   [facility]-P[facility] Initial Evaluation    Patient Name: Soraida Rousseau  Date of evaluation: 7/18/2017  Referral Diagnosis: Hip pain, acute, right, Low Back Pain  Referring provider: Hannah Suresh NP  Visit Diagnosis:     ICD-10-CM    1. Chronic right-sided low back pain without sciatica M54.5     G89.29    2. Pain of right hip joint M25.551    3. Generalized muscle weakness M62.81        Assessment:      Pt. is appropriate for skilled PT intervention as outlined in the Plan of Care (POC).  Pt. is a good candidate for skilled PT services to improve pain levels and function.    Goals:  Pt. will demonstrate/verbalize independence in self-management of condition in : 6 weeks  Pt will: Able to sit 1-2 hours through a movie without having to change positions within 4-6 weeks  Pt will: Able to sleep through the night without waking due to pain within 4-6 weeks  Pt will: Able to bend to do activities such as switching laudry from washer to dryer or picking up objects within 4-6 weeks  Pt will: Able to stand 20 minutes to wash dishes with pain 0-2/10 within 4-6 weeks    Patient's expectations/goals are realistic.    Barriers to Learning or Achieving Goals:  Legally blind, asim       Plan / Patient Instructions:        Plan of Care:   Authorization / Certification Start Date: 07/18/17  Authorization / Certification End Date: 10/13/17  Authorization / Certification Number of Visits: 8-12  Communication with: Referral Source  Patient Related Instruction: Nature of Condition;Treatment plan and rationale;Self Care instruction;Basis of treatment;Body mechanics;Posture  Times per Week: 2  Number of Weeks: 4-6  Number of Visits: 8-12  Therapeutic Exercise: ROM;Stretching;Strengthening  Neuromuscular Reeducation: posture;kinesio tape;core  Manual Therapy: myofascial release;joint mobilization  Modalities: electrical stimulation;ultrasound    Plan for next visit: Review stretches                                If hip flexor stretch in kneeling is not effective, change to another position                               Add other low back and hip stretches as needed                               MFR right low back and right hip                                Joint mobs left lumbar transverse process                               Edu on posture and body mechanics     Subjective:         Social information:   Living Situation:single family home, lives with others  and stairs  with railing   Occupation:   Work Status:Working full time    History of Present Illness:    Soraida is a 37 y.o. female who presents to therapy today with complaints of right hip and low back pain. Date of onset/duration of symptoms is 2016. Onset was sudden as she fell on the ice last December she noticed more pain in the back.  Over the years she has had pain on and off in her back as well as when she was pregnant.   She did have a hip injury in high school when she played soccer but hasn't noticed a lot of pain over the years. Now will have pain if she tries to sleep on that side.  Over the past few months she has noticed more pain in that right hip.  She had seen a chiropractor prior to her neck surgery but hasn't been back since.  Symptoms are intermittent and getting worse.  The hip pain may go down her leg and has pain.  Denies numbness or tingling and has not had any recent imaging.        Pain Ratin  Pain rating at best: 0  Pain rating at worst: 9  Pain description: aching, dull, sharp, shooting, soreness, tingling and weakness, stiffness    Functional limitations are described as occurring with:   Sleep, sit, stand, bending, lifting, kneeling, squatting    Patient reports benefit from:  stretches, Aleve and boyfriend may massage low back         Objective:      Note: Items left blank indicates the item was not performed or not indicated at the time of the evaluation.    Patient Outcome Measures :    Modified Oswestry  Low Back Pain Disablity Questionnaire  in %: 32   Scores range from 0-100%, where a score of 0% represents minimal pain and maximal function. The minimal clinically important difference is a score reduction of 12%.    Examination  1. Chronic right-sided low back pain without sciatica     2. Pain of right hip joint     3. Generalized muscle weakness       Involved side: Right  Posture Observation:      General standing posture is with mild to moderate decrease in cervical, thoracic and lumbar lordosis.    Lumbar ROM:         Within Normal Limits unless noted  Date: 7/18/17     *Indicate scale AROM AROM AROM   Lumbar Flexion Pain during movement     Lumbar Extension Pain during movement      Right Left Right Left Right Left   Lumbar Sidebending         Lumbar Rotation         Thoracic Flexion      Thoracic Extension      Thoracic Sidebending         Thoracic Rotation           Hip ROM                Within Normal Limits Unless Noted  Date: 7/18/17     Hip ROM( ) AROM in degrees AROM in degrees AROM in degrees    Right Left Right Left Right Left   Hip Flexion (0-120 )         Hip Abduction (0-45 )         Hip External Rotation (0-50 )         Hip Internal Rotation (0-40 )  25       Hip Extension (0-15 )          PROM in degrees PROM in degrees PROM in degrees    Right Left Right Left Right Left   Hip Flexion (0-120 )         Hip Abduction (0-45 )         Hip External Rotation (0-50 )         Hip Internal Rotation (0-40 )         Hip Extension (0-15 )               Lower Extremity Strength:     Date: 7/18/17     LE strength/5 Right Left Right Left Right Left   Hip Flexion (L1-3) 5 5       Hip Extension (L5-S1)         Hip Abduction (L4-5) 4+ 4+       Hip Adduction (L2-3) 5 5       Hip External Rotation 5 5       Hip Internal Rotation 5 5       Knee Extension (L3-4) 5 5       Knee Flexion 5 5       Ankle Dorsiflexion (L4-5)         Great Toe Extension (L5)         Ankle Plantar flexion (S1)         Abdominals     "    Sensation           Reflex Testing  Lumbar Dermatomes Right Left UE Reflexes Right Left   Iliac Crest and Groin (L1) - - Biceps (C5-6)     Anterior Medial Thigh (L2) - - Brachioradialis (C5-6)     Anterior Thigh, Medial Epicondyle Femur (L3) - - Triceps (C7-8)     Lateral Thigh, Anterior Knee, Medial Leg/Malleolus (L4) - - Julia s test     Lateral Leg, Dorsal Foot (L5) - - LE Reflexes     Lateral Foot (S1)   Patellar (L3-4)     Posterior Leg (S2)   Achilles (S1-2)     Other:   Babinski Response       Palpation: right lumbar paraspinals, Right QL, Right piriformis, right sacrotuberous ligament, left gluteius medius, posterior and inferior to right greater trochanter, right iliacus, right posterior knee (suspect a Baker's Cyst)  Hypomobile throughout left lumbar at transverse processes  1 Leg Stance: 20 + seconds bilaterally  Trendelenberg: negative bilaterally  Squat: \"S\" shape hip deviation to left, bilateral knees adduct  Able to toe and heel walk  Bridging: weakness noted with quality of task    Hip Special Tests  OA Right (+/-) Left (+/-) Intra Articular Right (+/-) Left (+/-)   Hip Scour - - ANRUAG - -   Test Cluster  -Hip pain  -Hip IR <15   -Hip Flex <115  -  -  - -  -  - FADIR     Test Cluster  -Painful Hip IR  ->50 years old  -Morning Stiffness <60 min   Passive Supine Rotation Test     Misc. Right (+/-) Left (+/-) Stinchfield Test (SLR Against Resistance)     Ely s   DEXRIT     Berenice s - - DIRI     Trendelenburg - - Posterior Rim Impingement     SIJ Right (+/-) Left (+/-) Lateral Rim Impingement     SIJ Compression - - Other     SIJ Distraction   Other     POSH Test   Other     Sacral Thrust   Other           Lumbar Special Tests:     Lumbar Special Tests Right Left SI Tests Right  Left   Quadrant test - - SI Compression     Straight leg raise - - SI Distraction     Crossover response - - POSH Test     Slump   Sacral Thrust     Sit-up test  FADIR     Trunk extensor endurance test  Resisted Abduction   "   Prone instability test  Other:     Pubic shotgun  Other:         Exercises:  Exercise #1: sitting hamstring stretch, kneeling hip flexor stretch, supine piriformis  Comment #1: 30 seconds X 1-3 reps    Treatment Today     TREATMENT MINUTES COMMENTS   Evaluation 30 Hip and Lumbar   Self-care/ Home management     Manual therapy     Neuromuscular Re-education     Therapeutic Activity     Therapeutic Exercises 25 Edu on DX, POC, see flow sheet or above for HEP.   Gait training     Modality__________________                Total 55    Blank areas are intentional and mean the treatment did not include these items.       PT Evaluation Code: (Please list factors)  Patient History/Comorbidities: legally blind, overweight  Examination:   Clinical Presentation: stable  Clinical Decision Making: low    Patient History/  Comorbidities Examination  (body structures and functions, activity limitations, and/or participation restrictions) Clinical Presentation Clinical Decision Making (Complexity)   No documented Comorbidities or personal factors 1-2 Elements Stable and/or uncomplicated Low   1-2 documented comorbidities or personal factor 3 Elements Evolving clinical presentation with changing characteristics Moderate   3-4 documented comorbidities or personal factors 4 or more Unstable and unpredictable High              Yoli Cervantes, PT  7/18/2017  8:59 AM

## 2021-06-11 NOTE — PROGRESS NOTES
1. Back pain  cyclobenzaprine (FLEXERIL) 10 MG tablet   2. Overweight (BMI 25.0-29.9)     3. Tobacco abuse     4. Hip pain, acute, right  Ambulatory referral to Physical Therapy   5. Major depressive disorder, recurrent episode  FLUoxetine (PROZAC) 20 MG capsule         ASSESSMENT/PLAN:     The following high BMI interventions were performed this visit: encouragement to exercise, weight monitoring, exercise promotion: strength training and exercise promotion: stretching    1. Overweight (BMI 25.0-29.9)  -continue to work on eating more whole foods and exercising as able, work on strength training.     2. Tobacco abuse  -Discussed with patient, she is not ready to quit at this time    3. Back pain    - cyclobenzaprine (FLEXERIL) 10 MG tablet; Take 1 tablet (10 mg total) by mouth 3 (three) times a day as needed for muscle spasms.  Dispense: 15 tablet; Refill: 0    4. Hip pain, acute, right    - Ambulatory referral to Physical Therapy    5. Major depressive disorder, recurrent episode    - FLUoxetine (PROZAC) 20 MG capsule; Take 1 capsule (20 mg total) by mouth daily.  Dispense: 90 capsule; Refill: 1      There are no Patient Instructions on file for this visit.  Medications Discontinued During This Encounter   Medication Reason     FLUoxetine (PROZAC) 20 MG capsule Reorder     Patient instructed to follow-up with her PCP in the next 6-8 weeks if her lower back pain is not improving.  Will wait to see what the physical therapy specialist say about her range of motion and if they have any other suggestions or see if they indicate and findings that would suggest that the patient may need imaging studies performed.  Smoking cessation discussed with patient.  She is not ready to quit at this time.  She is concerned about her weight, she has gained about 13 pounds in the last year.  Did discuss weight training for her as she is able with her low back pain. She will talk to the physical therapy team more about this during  her evaluation.  Patient has decided to resume her Fluoxetine prescription today.  She did discuss this back in April with her PCP, Dr. Rico and she feels that the current dose of 20 mg has always been effective for managing her depression symptoms. She is up-to-date with all immunizations.     The visit lasted a total of 30 minutes face to face with the patient.  Over 50% of the time spent counseling and educating the patient about that above content.              SUBJECTIVE:  Soraida Rousseau is a 37 y.o. female presents with low back pain that started 7 months ago after she fell on the ice landing on her buttocks.  He denies hearing any cracking sound upon falling.  She states that the pain comes and goes and describes it as an aching, sharp sensation.  She does have some pain that radiates into the right side of her hip and right hamstring.  She has had a right hip injury dating back to high school from when she played soccer.  He has had flareups since that time in the right hip.  He works a desk job full-time and states that she does sit on her chair awkwardly.  She always knows when to change position because the right lower extremity will begin to tingle and becomes numb.  She does not experience any numbness or tingling of the right lower extremity when she is sitting or standing in a normal position.  She is a current everyday smoker.  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 She continues to have intermittent neck stiffness.   Chief Complaint   Patient presents with     Referral     Low back and RT hip pain - hx of neck pain but pt fell last December and gets flare ups         Patient Active Problem List   Diagnosis     Legally Blind ([place] Definition) In The Right Eye     Allergic Rhinitis     Headache     Neck Pain     Lower Back Pain     Abnormal Weight Gain     Major Depression, Recurrent     Anxiety       Current Outpatient Prescriptions   Medication  Sig Dispense Refill     albuterol (PROVENTIL HFA;VENTOLIN HFA) 90 mcg/actuation inhaler Inhale 2 puffs every 6 (six) hours as needed for wheezing. Every 4 to 6 hours as needed. 1 Inhaler 0     FLUoxetine (PROZAC) 20 MG capsule Take 1 capsule (20 mg total) by mouth daily. 90 capsule 1     loratadine (CLARITIN) 10 mg tablet Take 10 mg by mouth.       naproxen sodium (ALEVE) 220 MG tablet Take 220 mg by mouth every evening.       cyclobenzaprine (FLEXERIL) 10 MG tablet Take 1 tablet (10 mg total) by mouth 3 (three) times a day as needed for muscle spasms. 15 tablet 0     No current facility-administered medications for this visit.        History   Smoking Status     Current Some Day Smoker     Packs/day: 0.10     Years: 20.00   Smokeless Tobacco     Never Used       REVIEW OF SYSTEMS: Denies saddle anesthesia, weakness, loss of bowel/bladder control.    TOBACCO USE:  History   Smoking Status     Current Some Day Smoker     Packs/day: 0.10     Years: 20.00   Smokeless Tobacco     Never Used     Social History     Social History     Marital status: Single     Spouse name: N/A     Number of children: 2     Years of education: 18     Occupational History      [employer] [place]     Social History Main Topics     Smoking status: Current Some Day Smoker     Packs/day: 0.10     Years: 20.00     Smokeless tobacco: Never Used     Alcohol use 1.0 - 1.5 oz/week     2 - 3 Standard drinks or equivalent per week     Drug use: No     Sexual activity: Yes     Partners: Male     Birth control/ protection: Surgical      Comment: tubal      Other Topics Concern     Not on file     Social History Narrative         OBJECTIVE:            Vitals:    07/13/17 0958   BP: 126/84   Pulse: 96   Resp: 16   SpO2: 98%     Weight: 186 lb (84.4 kg)  Wt Readings from Last 3 Encounters:   07/13/17 186 lb (84.4 kg)   04/05/17 185 lb 4.8 oz (84.1 kg)   08/09/16 175 lb 12.8 oz (79.7 kg)     Body mass index is 29.13 kg/(m^2).        Physical Exam:  GENERAL  APPEARANCE: A&A, NAD, well hydrated, well nourished  SKIN:  Normal skin turgor, no lesions/rashes   NECK: Supple, without lymphadenopathy, no thyroid mass, no JVD, full ROM  CV: RRR, no M/G/R   LUNGS: CTAB, normal respiratory effort  ABDOMEN: S&NT, no masses, no organomegaly, BS present in all quadrants   BACK: increased low back pain with squatting and spinal flexion. Increased right hip pain when laying flat on back and with abduction. No knee pain with flexion or extension, negative drawer test.  NEURO: no gross deficits, DTR's intact  PSYCHIATRIC;  Mood appropriate, memory intact, very pleasant.    Hannah Suresh, NP

## 2021-06-12 NOTE — PROGRESS NOTES
[facility] Daily Progress     Patient Name: Soraida Rousseau  Date: 2017  Visit #: 3  PTA visit #:     Referral Diagnosis: LBP, R hip pain  Referring provider: Hannah Suresh NP  Visit Diagnosis:     ICD-10-CM    1. Chronic right-sided low back pain without sciatica M54.5     G89.29    2. Pain of right hip joint M25.551    3. Generalized muscle weakness M62.81          Assessment:     Patient is benefitting from skilled physical therapy and is making steady progress toward functional goals.  Patient is appropriate to continue with skilled physical therapy intervention, as indicated by initial plan of care.  Patient is experiencing good response with physical therapy.  Her pain levels have decreased and she is more aware of posture  Patient will try to utilize good mechanics to ensure the correct muscles engage so there is less pain    Plan / Patient Education:     Plan to con't with manual therapy to decrease fascial tension/tone to normalize ROM/decrease inflammation/decrease mm tone and improve proprioception.      Subjective:     Pain Ratin     I felt great after last visit.  I didn't have much pain  I woke a little sore, did stretches, so felt good   The back of my knee feels much better    Objective:     LV, MS fascial tension.     Exercises:  Exercise #1: sitting hamstring stretch, kneeling hip flexor stretch, supine piriformis  Comment #1: 30 seconds X 1-3 reps  Exercise #2: clamshells  Comment #2: 20  Exercise #3: bridgers   wtih abd and hip engaging  Comment #3: 10 seconds X 6-12 reps    Posture edu for sitting and standing  Body mechanics edu: sweep, pivot, vacuum, overhead, level lift, push, pull, , 1 handed lift, golfer's lift, power position and lift    Treatment Today   2017   TREATMENT MINUTES COMMENTS   Evaluation     Self-care/ Home management     Manual therapy Not today Fascial release using Strain-Counterstrain of B LELF-MS, BLE/lumbopelvic-LV    Neuromuscular Re-education Not today Recip inhib of LV, MS fascia   Therapeutic Activity     Therapeutic Exercises 30 See flow sheet or above; posture edu and body mechanics edu-see above   Gait training     Modality__________________                Total 55    Blank areas are intentional and mean the treatment did not include these items.       Yoli Cervantes  7/28/2017

## 2021-06-12 NOTE — PROGRESS NOTES
[facility] Daily Progress     Patient Name: Soraida Rousseau  Date: 7/24/2017  Visit #: 2  PTA visit #:     Referral Diagnosis: LBP, R hip pain  Referring provider: Marissa Rico MD  Visit Diagnosis:     ICD-10-CM    1. Chronic right-sided low back pain without sciatica M54.5     G89.29    2. Pain of right hip joint M25.551    3. Generalized muscle weakness M62.81          Assessment:     Patient is benefitting from skilled physical therapy and is making steady progress toward functional goals.  Patient is appropriate to continue with skilled physical therapy intervention, as indicated by initial plan of care.  There was no pain in the back of the knee when standing and a little tender when deep flexing her knee post treatment. She had excellent release of fascial tensions with all treatment today.     Plan / Patient Education:     Plan to con't with manual therapy to decrease fascial tension/tone to normalize ROM/decrease inflammation/decrease mm tone and improve proprioception.      Subjective:     Pain Rating: 3     She did find that the stretches did help. There is still the pain in her lower back. The knee has really been pretty painful and is wearing a compression sleeve on her knee.     Objective:     LV, MS fascial tension.     Treatment Today   7/24/2017   TREATMENT MINUTES COMMENTS   Evaluation     Self-care/ Home management     Manual therapy 25 Fascial release using Strain-Counterstrain of B LELF-MS, BLE/lumbopelvic-LV   Neuromuscular Re-education 15 Recip inhib of LV, MS fascia   Therapeutic Activity     Therapeutic Exercises 15 Ed on fascia and treatment  Pelvic rocking and ed on posture  AA/PROM to BLE, pelvis, L-T-spine   Gait training     Modality__________________                Total 55    Blank areas are intentional and mean the treatment did not include these items.       Zen Foreman  7/24/2017

## 2021-06-12 NOTE — PROGRESS NOTES
[facility] Daily Progress     Patient Name: Soraida Rousseau  Date: 2017  Visit #: 4  PTA visit #:     Referral Diagnosis: LBP, R hip pain  Referring provider: Hannah Suresh NP  Visit Diagnosis:     ICD-10-CM    1. Chronic right-sided low back pain without sciatica M54.5     G89.29    2. Pain of right hip joint M25.551    3. Generalized muscle weakness M62.81          Assessment:     Patient is benefitting from skilled physical therapy and is making steady progress toward functional goals.  Patient is appropriate to continue with skilled physical therapy intervention, as indicated by initial plan of care.  She did have decreased pain post treatment today with good release of fascial tension. She may need to improve on her TA and glut activation to allow her to complete ex as instructed.     Plan / Patient Education:     Plan to con't with manual therapy to decrease fascial tension/tone to normalize ROM/decrease inflammation/decrease mm tone and improve proprioception.      Subjective:     Pain Ratin-5     Today isn't that good of a day. She has been doing her stretches and it doesn't seem to help. She woke up at 430 and hasn't been able to get back to sleep.   Other than today she has been doing good. The back of the knee isn't as bad as it was.     Objective:     MS, art, LV fascial tension.       Treatment Today   2017   TREATMENT MINUTES COMMENTS   Evaluation     Self-care/ Home management     Manual therapy 25 Fascial release using Strain-Counterstrain of B L4-Y2LS-LE, B posterior pelvic-Art, B lumbar EPI-LV, ANAL-V, REP-A L    Neuromuscular Re-education     Therapeutic Activity     Therapeutic Exercises     Gait training     Modality__________________                Total 25    Blank areas are intentional and mean the treatment did not include these items.       Zen Foreman  2017

## 2021-06-13 NOTE — PROGRESS NOTES
Name: Soraida Rousseau  Age: 37 y.o.  Gender: female  : 1980  Date of Encounter: 2017      HPI:  Soraida Rousseau is a 37 y.o.  female with history of allergic rhinitis who presents to the clinic with concerns of cough for last 2 weeks. Reports symptoms started with runny nose, and sneezing.  Report last several days cough productive of yellowish green sputum and feels wheezy.  Has inhaler for allergy induced wheezing.  Started using her inhaler a couple days ago with this illness and is using inhaler about twice a day. Last used inhaler 3 hours earlier. Denies fever, chills, body aches, face pain and pressure, sore throat, ear pain, nausea, vomiting, diarrhea and skin rash.     Current Medication:   Medications reviewed and updated.    Current Outpatient Prescriptions:      albuterol (PROVENTIL HFA;VENTOLIN HFA) 90 mcg/actuation inhaler, Inhale 2 puffs every 6 (six) hours as needed for wheezing. Every 4 to 6 hours as needed., Disp: 1 Inhaler, Rfl: 0     FLUoxetine (PROZAC) 20 MG capsule, Take 1 capsule (20 mg total) by mouth daily., Disp: 90 capsule, Rfl: 1     loratadine (CLARITIN) 10 mg tablet, Take 10 mg by mouth., Disp: , Rfl:      cyclobenzaprine (FLEXERIL) 10 MG tablet, Take 1 tablet (10 mg total) by mouth 3 (three) times a day as needed for muscle spasms., Disp: 15 tablet, Rfl: 0     naproxen sodium (ALEVE) 220 MG tablet, Take 220 mg by mouth every evening., Disp: , Rfl:     Past Med / Surg History:  Past Medical History:   Diagnosis Date     Abnormal Pap smear of cervix 2017    LSIL, colpo done, essentially benign, recheck with Dr Less 6 mo, if ok, then routine exams with me     Allergic rhinitis     seasonal allergies, takes claritin     Anxiety      Bronchospasm     use inhaler prn     Chronic neck and back pain      Headache      Legally blind in right eye, as defined in [place]      Low back pain      Major depressive disorder, recurrent episode, unspecified      Miscarriage      Normal delivery      x2     Past Surgical History:   Procedure Laterality Date     APPENDECTOMY       CERVICAL LAMINECTOMY  2006    left C5-6 hemilaminectomy, microforaminotomy     COLPOSCOPY  2017    LSIL, colpo done, essentially benign, recheck with Dr Lopez 6 mo, if ok, then routine exams with me     elective       x4     tubal removal  2016    Dr Lopez       Fam / Soc History:  Family History   Problem Relation Age of Onset     Alcohol abuse Father      Arthritis Father      Coronary artery disease Father      Hypertension Father      Heart disease Father      enlarged heart     Alcohol abuse Paternal Grandmother      Allergic rhinitis Paternal Grandmother      Arthritis Paternal Grandmother      Alcohol abuse Paternal Grandfather      Arthritis Paternal Grandfather      Heart disease Paternal Grandfather      Hyperlipidemia Paternal Grandfather      Hypertension Paternal Grandfather      Alcohol abuse Maternal Grandmother      Arthritis Maternal Grandmother      Hypertension Maternal Grandmother      Hyperlipidemia Maternal Grandmother      Thyroid disease Maternal Grandmother      Alcohol abuse Brother      Allergic rhinitis Sister      Allergic rhinitis Son      Allergic rhinitis Mother      Arthritis Mother      Depression Mother      Asthma Brother      Cancer Cousin      Brain     Coronary artery disease Other      Depression Other      both paternal and maternal sides     Diabetes Paternal Uncle      Diabetes Sister      Heart disease Paternal Uncle      Heart disease Paternal Aunt      Skin cancer Maternal Grandfather      Thyroid disease Sister      Thyroid disease Maternal Aunt      Breast cancer Maternal Aunt      dx age 45     Dementia Other      mggm     Social History     Social History     Marital status: Single     Spouse name: N/A     Number of children: 2     Years of education: 18     Occupational History      [employer]     Social History Main Topics     Smoking status: Current Some  Day Smoker     Packs/day: 0.10     Years: 20.00     Smokeless tobacco: Never Used     Alcohol use 1.0 - 1.5 oz/week     2 - 3 Standard drinks or equivalent per week     Drug use: No     Sexual activity: Yes     Partners: Male     Birth control/ protection: Surgical      Comment: tubal      Other Topics Concern     Not on file     Social History Narrative       ROS:  14 point review of systems unremarkable except as mentioned in HPI    Objective:  Vitals: /72 (Patient Site: Right Arm, Patient Position: Sitting, Cuff Size: Adult Large)  Pulse 94  Temp 98.5  F (36.9  C) (Oral)   Resp 16  Wt 175 lb 1 oz (79.4 kg)  SpO2 97%  BMI 27.42 kg/m2    Gen: Alert, awake, well appearing  HEENT: NC, AT,   Ears:  Ear canals clear.  TMs normal appearing.  Eyes:  EOMI.  Pupils equally round and reactive to light. Conjunctivae clear.  Sclera non-icteric.  Nose:  Nasal mucosa boggy, septum midline.  No sinus tenderness  Mouth:  MMM. Oropharynx clear. No tonsillar exudate. Teeth, gum, tongue and lips clear.  Neck:  Supple, FROM, No lymphandenpathy, No TM  Heart: Regular rate and rhythm; normal S1 and S2; no murmurs, gallops, or rubs.  Peripheral Vessels: Normal pulses and perfusion.  Lungs: Unlabored respirations; symmetric chest expansion; clear breath sounds.  Extremities: No clubbing, cyanosis, or edema. Normal upper and lower extremities.  Skin: Normal turgor and without lesions or rashes.  Mental Status: Alert, oriented, in no distress. Mood and affect appropriate.    Pertinent results / imaging:  none    Assessment:  Bronchitis    Plan:   Prescribed azithromycin 5 day course and benzonatate.  Advised scheduling use of albuterol inhaler for every 4 hours for the next 2-3 days until cough starts to improve. Advised fluids, rest, humidified air, sleeping with head elevated and other over the counter medication for symptom relief. Reviewed expected course, duration of symptoms and reasons to return for reevaluation.   Patient voiced understanding and was in agreement with plan.      Nati Banda MD  9/29/2017

## 2021-06-13 NOTE — PROGRESS NOTES
[facility] Discharge Summary  Patient Name: Soraida Rousseau  Date: 9/19/2017  Referral Diagnosis: R LBP/hip pain  Referring provider: Hannah Suresh NP  Visit Diagnosis:   1. Chronic right-sided low back pain without sciatica     2. Pain of right hip joint     3. Generalized muscle weakness         Goals:  Pt. will demonstrate/verbalize independence in self-management of condition in : 6 weeks  Pt will: Able to sit 1-2 hours through a movie without having to change positions within 4-6 weeks  Pt will: Able to sleep through the night without waking due to pain within 4-6 weeks  Pt will: Able to bend to do activities such as switching laudry from washer to dryer or picking up objects within 4-6 weeks  Pt will: Able to stand 20 minutes to wash dishes with pain 0-2/10 within 4-6 weeks    Patient was seen for 4 visits from 7/18/17 to 7/31/17 with 1 missed appointments.  The patient attended therapy initially, but did not finish the therapy sessions prescribed.  Goals were not fully achieved. Explanation for goals not achieved: she was improving with her symptoms but has failed to make further follow-ups.     Therapy will be discontinued at this time.  The patient will need a new referral to resume.    Thank you for your referral.  Zen Foreman  9/19/2017  5:03 PM

## 2021-06-14 NOTE — TELEPHONE ENCOUNTER
Refill Approved    Rx renewed per Medication Renewal Policy. Medication was last renewed on 8/10/20.    Lorraine Mata, [facility] Triage/Med Refill 1/22/2021     Requested Prescriptions   Pending Prescriptions Disp Refills     FLUoxetine (PROZAC) 20 MG capsule [Pharmacy Med Name: FLUOXETINE 20MG CAPSULES] 30 capsule 0     Sig: TAKE 1 CAPSULE(20 MG) BY MOUTH DAILY       [facility] Refill Protocol  Passed - 1/21/2021  3:54 PM        Passed - PCP or prescribing provider visit in last year     Last office visit with prescriber/PCP: 5/31/2019 Marissa Rico MD OR same dept: Visit date not found OR same specialty: 5/31/2019 Marissa Rico MD  Last physical: 5/11/2018 Last MTM visit: Visit date not found   Next visit within 3 mo: Visit date not found  Next physical within 3 mo: Visit date not found  Prescriber OR PCP: Marissa Rico MD  Last diagnosis associated with med order: 1. Moderate episode of recurrent major depressive disorder (H)  - FLUoxetine (PROZAC) 20 MG capsule [Pharmacy Med Name: FLUOXETINE 20MG CAPSULES]; TAKE 1 CAPSULE(20 MG) BY MOUTH DAILY  Dispense: 30 capsule; Refill: 0    2. Anxiety  - FLUoxetine (PROZAC) 20 MG capsule [Pharmacy Med Name: FLUOXETINE 20MG CAPSULES]; TAKE 1 CAPSULE(20 MG) BY MOUTH DAILY  Dispense: 30 capsule; Refill: 0    If protocol passes may refill for 12 months if within 3 months of last provider visit (or a total of 15 months).

## 2021-06-14 NOTE — TELEPHONE ENCOUNTER
Refill Approved    Rx renewed per Medication Renewal Policy. Medication was last renewed on 5/14/20, last OV 6/5/20.    Janelle Crow, [facility] Triage/Med Refill 12/25/2020     Requested Prescriptions   Pending Prescriptions Disp Refills     albuterol (PROAIR HFA;PROVENTIL HFA;VENTOLIN HFA) 90 mcg/actuation inhaler 1 Inhaler 0     Sig: Inhale 1-2 puffs every 6 (six) hours as needed for wheezing.       Albuterol/Levalbuterol Refill Protocol Passed - 12/22/2020 10:49 AM        Passed - PCP or prescribing provider visit in last year     Last office visit with prescriber/PCP: 5/31/2019 Marissa Rico MD OR same dept: Visit date not found OR same specialty: 5/31/2019 Marissa Rico MD Last physical: 5/11/2018       Next appt within 3 mo: Visit date not found  Next physical within 3 mo: Visit date not found  Prescriber OR PCP: Marissa Rico MD  Last diagnosis associated with med order: 1. Seasonal allergic rhinitis, unspecified trigger  - albuterol (PROAIR HFA;PROVENTIL HFA;VENTOLIN HFA) 90 mcg/actuation inhaler; Inhale 1-2 puffs every 6 (six) hours as needed for wheezing.  Dispense: 1 Inhaler; Refill: 0    If protocol passes may refill for 6 months if within 3 months of last provider visit (or a total of 9 months). If patient requesting >1 inhaler per month refill x 6 months and have patient make appointment with provider.

## 2021-06-17 NOTE — PROGRESS NOTES
Soraida Rousseau is a 38 y.o. here for a Pap smear and physical exam. Patient is overall doing well.  She has had a history of Pap smear abnormalities.  She had a normal Pap smear with positive HPV about a year ago.  She was referred to [facility] who did a colposcopy and told her to follow-up in about 6 months.  She had a Pap smear done in February 2018 at [facility] and that was normal and so she was instructed that she needed to follow-up in a year for another Pap smear.  She therefore does not need a Pap smear today.  She overall otherwise is feeling okay.  She is now over the age of 38 and her aunt had breast cancer so mammogram card was given today and she will call and schedule her mammogram.  She does note that she is having a lot of worry about her son.  She continues on her Prozac but her anxiety seems to be getting worse.  Her son is addicted to opioids and is living with her.  She is not sure exactly how to go about helping him without enabling him.  We talked about maybe to calling [employer] to see if they can offer any suggestions on how to work through this.  In terms of the Prozac and seems to working as well as it can.  Please see PHQ 9 and NATALIE which are both completed in their entirety today.  She is not having side effects to the medication.  She occasionally will forget to take it.  She actually was off the medication for quite some time because she was doing well and she started back on it about a month ago.  Seems to be kicking in again now and she is quite thankful for that. She does not need a refill of her medication today but when she does she certainly will let me know.  She does note that she continues to have chronic back pain.  She will occasionally need to use some Flexeril to help her get through the muscle spasms.  Most of the time she is able to get through without anything but she is wanting she can have a small prescription of Flexeril today to have at home in case nothing  else is helpful.  On review of the chart we did give her 15 tablets in July and she has not asked for another refill until now so obviously she is using it on a very sparing basis.  She does need a Tdap vaccine today.  We also discussed getting hepatitis A and hepatitis B vaccines as does not appear that she has ever had those either.    Healthy Habits:   Regular Exercise: No  Sunscreen Use: Yes  Healthy Diet: No  Dental Visits Regularly: Yes  Seat Belt: Yes  Sexually active: Yes  Self Breast Exam Monthly:Yes  Hemoccults: No  Flex Sig: No  Colonoscopy: No  Lipid Profile: Yes  Glucose Screen: Yes  Prevention of Osteoporosis: Yes  Last Dexa: No  Guns at Home:  No  Domestic Violence:  No    Current Outpatient Medications Include:    Current Outpatient Prescriptions:      FLUoxetine (PROZAC) 20 MG capsule, Take 1 capsule (20 mg total) by mouth daily., Disp: 90 capsule, Rfl: 0     loratadine (CLARITIN) 10 mg tablet, Take 10 mg by mouth., Disp: , Rfl:      naproxen sodium (ALEVE) 220 MG tablet, Take 220 mg by mouth every evening., Disp: , Rfl:      cyclobenzaprine (FLEXERIL) 10 MG tablet, Take 1 tablet (10 mg total) by mouth 3 (three) times a day as needed for muscle spasms., Disp: 10 tablet, Rfl: 0    Allergies:    Allergies   Allergen Reactions     Nitrofurantoin Monohyd/M-Cryst Wheezing and Nausea Only       Past Medical History:   Diagnosis Date     Abnormal Pap smear of cervix 04/2017    LSIL, colpo done, essentially benign, pap done 2/18 was ok so now back to yearly paps     Allergic rhinitis     seasonal allergies, takes claritin     Anxiety      Bronchospasm     use inhaler prn     Chronic neck and back pain      Headache      Legally blind in right eye, as defined in [place]      Low back pain      Major depressive disorder, recurrent episode, unspecified      Miscarriage      Normal delivery     x2       Past Surgical History:   Procedure Laterality Date     APPENDECTOMY  1991     CERVICAL LAMINECTOMY  8/2006     left C5-6 hemilaminectomy, microforaminotomy     COLPOSCOPY  2017    LSIL, colpo done, essentially benign, repap done  was ok, so back to yearly paps with me     elective       x4     tubal removal  2016    Dr Lopez       Immunization History   Administered Date(s) Administered     Hep A, Adult IM (19yr & older) 2018     Hep B, Adult 2018     Influenza, inj, historic,unspecified 10/15/2015     Influenza, seasonal,quad inj 6-35 mos 1900, 2009     MMR 1990     Rho (D) Immune Globulin 2002     Td, Adult, Absorbed 1995, 2005     Tdap 2009, 2018       Family History   Problem Relation Age of Onset     Alcohol abuse Father      Arthritis Father      Coronary artery disease Father      Hypertension Father      Heart disease Father      enlarged heart     Alcohol abuse Paternal Grandmother      Allergic rhinitis Paternal Grandmother      Arthritis Paternal Grandmother      Alcohol abuse Paternal Grandfather      Arthritis Paternal Grandfather      Heart disease Paternal Grandfather      Hyperlipidemia Paternal Grandfather      Hypertension Paternal Grandfather      Alcohol abuse Maternal Grandmother      Arthritis Maternal Grandmother      Hypertension Maternal Grandmother      Hyperlipidemia Maternal Grandmother      Thyroid disease Maternal Grandmother      Alcohol abuse Brother      Allergic rhinitis Sister      Allergic rhinitis Son      Allergic rhinitis Mother      Arthritis Mother      Depression Mother      Asthma Brother      Cancer Cousin      Brain     Coronary artery disease Other      Depression Other      both paternal and maternal sides     Diabetes Paternal Uncle      Diabetes Sister      Heart disease Paternal Uncle      Heart disease Paternal Aunt      Skin cancer Maternal Grandfather      Thyroid disease Sister      Thyroid disease Maternal Aunt      Breast cancer Maternal Aunt      dx age 45     Dementia Other      mggm        Social History     Social History     Marital status: Single     Spouse name: N/A     Number of children: 2     Years of education: 18     Occupational History      [employer]     Social History Main Topics     Smoking status: Current Some Day Smoker     Packs/day: 0.75     Years: 20.00     Smokeless tobacco: Never Used     Alcohol use 0.6 oz/week     1 Standard drinks or equivalent per week      Comment: once a month     Drug use: No     Sexual activity: Yes     Partners: Male     Birth control/ protection: Surgical      Comment: tubal      Other Topics Concern     Not on file     Social History Narrative       Last cholesterol:   Lab Results   Component Value Date    CHOL 195 05/11/2018    CHOL 194 04/05/2017    CHOL 188 07/29/2015     Lab Results   Component Value Date    HDL 45 (L) 05/11/2018    HDL 44 (L) 04/05/2017    HDL 49 07/29/2015     Lab Results   Component Value Date    LDLCALC 117 05/11/2018    LDLCALC 126 04/05/2017    LDLCALC 122 07/29/2015     Lab Results   Component Value Date    TRIG 165 (H) 05/11/2018    TRIG 122 04/05/2017    TRIG 84 07/29/2015       Last mammogram: never, will set one up now that she is over the age of 38.  Her maternal aunt had breast cancer at a young age and therefore I feel it is important for her to get mammogram now that she is age 38.    Birth Control Method: Tubal ligation.  High Risk/Behavior: None    PREVENTATIVE SERVICES  Preventative services were reviewed today, 5/9/2018    LMP: Patient's last menstrual period was 04/23/2018.   Menstrual Regularity: monthly  Flow: normal    REVIEW OF SYSTEMS:  Denies fever, chills, visual changes, fatigue, myalgias, nasal congestion, rhinorrhea, ear pain or discharge, sore throat, swollen glands, breast mass, nipple discharge, breast changes, abdominal pain, nausea, vomiting, diarrhea, constipation, cough, shortness of breath, chest pain, weight change, change in bowel habits, melena, rectal bleeding, dysuria,  "frequency, urgency, hematuria, polyuria, polydipsia, polyphagia, joint pain or swelling or erythema, edema, rash, weakness, paresthesias, vaginal discharge or bleeding or mood changes.  Remainder of review of systems was negative.      PHYSICAL EXAM:  On exam, patient is a WD, WN 38 y.o. female in NAD.  /80  Pulse 83  Temp 98.4  F (36.9  C)  Ht 5' 7\" (1.702 m)  Wt 181 lb (82.1 kg)  LMP 04/23/2018  SpO2 98%  Breastfeeding? No  BMI 28.35 kg/m2  Head normocephalic, atraumatic.  Eyes PERRL, ears TM s clear bilaterally.  Throat without significant erythemia or exudate.  Neck was supple, full range of motion. No significant lymphadenopathy or thyromegaly was appreciated.  Lungs clear to auscultation  Heart regular rate and rhythm.  Breast exam was done. No masses were appreciated. Axilla were clear bilaterally. No nipple discharge was appreciated. Self breast exam was reviewed and taught today.  Abdomen was soft, nontender, nondistended. No masses or organomegaly were palpated. Positive bowel sounds were appreciated.  Extremities with full range of motion of all 4 extremities were noted.  Deep tendon reflexes were equal and symmetrical. Motor and sensation were intact to both the upper and lower extremities.  Cranial nerves 2 through 12 were grossly intact.  EOM were intact.  Pelvic exam deferred, patient just had exam by [facility] 2 months ago.     Recent Results (from the past 240 hour(s))   Hemoglobin   Result Value Ref Range    Hemoglobin 12.7 12.0 - 16.0 g/dL   Urinalysis Macroscopic   Result Value Ref Range    Color, UA Yellow Colorless, Yellow, Straw, Light Yellow    Clarity, UA Clear Clear    Glucose, UA Negative Negative    Bilirubin, UA Negative Negative    Ketones, UA Negative Negative    Specific Gravity, UA 1.025 1.005 - 1.030    Blood, UA Negative Negative    pH, UA 7.0 5.0 - 8.0    Protein, UA Negative Negative mg/dL    Urobilinogen, UA 0.2 E.U./dL 0.2 E.U./dL, 1.0 E.U./dL    Nitrite, UA " Negative Negative    Leukocytes, UA Negative Negative   Lipid Cascade   Result Value Ref Range    Cholesterol 195 <=199 mg/dL    Triglycerides 165 (H) <=149 mg/dL    HDL Cholesterol 45 (L) >=50 mg/dL    LDL Calculated 117 <=129 mg/dL    Patient Fasting > 8hrs? No        ASSESSMENT: 38 y.o. female physical exam and pap smear.    PLAN:     Routine general medical examination at a health care facility [Z00.00]    1. Routine general medical examination at a health care facility  - Hemoglobin  - Urinalysis Macroscopic  - Lipid Cascade    2. Major depressive disorder, recurrent episode    3. Anxiety    4. Allergic rhinitis    5. Need for Tdap vaccination  - Tdap vaccine,  6yo or older,  IM    6. Need for hepatitis B vaccination  - Hepatitis B Vaccine Age 20 years and above    7. Need for hepatitis A vaccination  - Hepatitis A adult 2 dose IM (19 yr & older)    8. Back pain  - cyclobenzaprine (FLEXERIL) 10 MG tablet; Take 1 tablet (10 mg total) by mouth 3 (three) times a day as needed for muscle spasms.  Dispense: 10 tablet; Refill: 0    Patient is a 38 y.o. female who is overall doing well.  She has recently had a Pap smear and therefore that was deferred today.  She did receive hepatitis B hepatitis A and Tdap vaccine today.  She will return in 2 months to get her second hepatitis B vaccine and then in 6 months when she returns for recheck of her depression she can receive her third hepatitis B and her second hepatitis A vaccination.  The Zoloft that she is on seems to be doing okay in terms of her depression and anxiety.  She will continue on this medication will let me know when she needs more.  She just recently restarted this about a month ago and it seems like it is starting to kick in now and she is quite excited about that.  She will schedule a mammogram in the near future.  She was given a small prescription for Flexeril to use when her back acts up tremendously.  If that is not helpful in the long run will  certainly let me know.  She is on Claritin for her allergies and that seems to be working fairly well.  All of her questions and concerns were addressed today.  If she has additional problems or concerns should let me know.    Will contact her with the results of the labs when available.  Marissa Rico M.D.

## 2021-06-22 NOTE — PROGRESS NOTES
PROGRESS NOTE   11/30/2018    SUBJECTIVE:  Soraida Rousseau is a 38 y.o. female  who presents for   Chief Complaint   Patient presents with     Advice Only     Colonoscopy     Immunizations     Hep A, and flu     Patient comes in today with a couple of questions for me as well as desire to have her third hepatitis B shot and her second hepatitis A shot as well as a flu vaccine.  She also needs a refill check on her depression.  She overall feels like things are doing well.  She continues on Prozac for depression that seems to be working well.  She has been on and off of antidepressants most of her life and now realizes that she just needs to remain on these medication.  When she is on the Prozac things seem to be going very well.  Please see PHQ 9 and NATALIE which are both completed in their entirety today.  She overall is feeling well and is not having side effects to this medication.  She is not suicidal or homicidal.  She notes that she continues to be fairly stressed with her oldest son.  He continues to abuse drugs which is difficult for her to handle.  She has tried to report him to the police however that is can have been unsuccessful.  She is working on trying to get him into treatment although he feels like outpatient treatment will be enough and she does not.  This of course is quite stressful to her because he uses drugs in her house.  She overall feels like despite all the stress that she is under with her son she is overall doing okay and is tolerating her Prozac without problems.  She just recently got a refill of the Prozac and so when she needs more she certainly will let me know.  We did review her chart and she does need a flu vaccine today.  She also needs her second hepatitis A vaccine and her third hepatitis B vaccine.  She will then have completed the series for both hepatitis A and hepatitis B.  She also is wondering about when she should get a colonoscopy.  She notes that her maternal grandmother  was just diagnosed with colon cancer and ovarian cancer in 2018.  She was 76 at the time of diagnosis.  No one else in her family has colon cancer that she is aware of.    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 90 capsule 2     naproxen sodium (ALEVE) 220 MG tablet Take 220 mg by mouth every evening.       cyclobenzaprine (FLEXERIL) 10 MG tablet Take 1 tablet (10 mg total) by mouth 3 (three) times a day as needed for muscle spasms. 10 tablet 0     loratadine (CLARITIN) 10 mg tablet Take 10 mg by mouth.       No current facility-administered medications for this visit.        Allergies   Allergen Reactions     Nitrofurantoin Monohyd/M-Cryst Wheezing and Nausea Only       Past Medical History:   Diagnosis Date     Abnormal Pap smear of cervix 2017    LSIL, colpo done, essentially benign, pap done  was ok so now back to yearly paps     Allergic rhinitis     seasonal allergies, takes claritin     Anxiety      Bronchospasm     use inhaler prn     Chronic neck and back pain      Headache      Legally blind in right eye, as defined in [place]      Low back pain      Major depressive disorder, recurrent episode, unspecified      Miscarriage      Normal delivery     x2       Past Surgical History:   Procedure Laterality Date     APPENDECTOMY       CERVICAL LAMINECTOMY  2006    left C5-6 hemilaminectomy, microforaminotomy     COLPOSCOPY  2017    LSIL, colpo done, essentially benign, repap done  was ok, so back to yearly paps with me     elective       x4     tubal removal  2016    Dr Lopez       Social History     Tobacco Use   Smoking Status Current Some  Day Smoker     Packs/day: 0.75     Years: 20.00     Pack years: 15.00   Smokeless Tobacco Never Used       OBJECTIVE:     /69 (Patient Site: Right Arm, Patient Position: Sitting, Cuff Size: Adult Regular)   Pulse 82   Wt 177 lb (80.3 kg)   SpO2 99%   BMI 27.72 kg/m      Physical Exam:  GENERAL APPEARANCE: A&A, NAD, well hydrated, well nourished  SKIN:  Normal skin turgor, no lesions/rashes   CV: RRR, no M/G/R   LUNGS: CTAB  EXTREMITY: no swelling noted.  Full range of motion of all 4 extremities.   NEURO: no gross deficits   PSYCHIATRIC;  Mood appropriate, memory intact  A&O x3, thought processes congruent, non-tangential. No hallucinations/delusions. Insight/judgment: intact. Denies suicidal/homicidal ideations.      ASSESSMENT/PLAN:     Moderate episode of recurrent major depressive disorder (H) [F33.1]      1. Moderate episode of recurrent major depressive disorder (H)    2. Need for hepatitis A immunization  - Hepatitis A adult 2 dose IM (19 yr & older)    3. Need for hepatitis B vaccination  - Hepatitis B Vaccine Age 20 years and above    4. Needs flu shot  - Influenza, Seasonal Quad, Preservative Free 36+ Months    Patient overall seems to be doing well.  She does have a history of depression but seems to be tolerating her Prozac well and seems to be working well for her.  She will continue on Prozac 20 mg a day.  She does not need a refill of this medication today but when she does she certainly will let me know.  PHQ 9 was done today.  She is not suicidal or homicidal.  Certainly if she has other changes in her symptoms will let me know.  We otherwise should see her in about 6 months for recheck of her Prozac.  She did desire flu vaccine which was given today as well as her third hepatitis B shot and her second hepatitis A shot.  We also did discuss colon cancer screening.  Her maternal grandmother was just recently diagnosed with colon cancer and ovarian cancer a few months ago.  She was diagnosed  at the age of 76.  No one else in patient's family has colon cancer.  We discussed that because she was 74 at the time of diagnosis we usually recommend colonoscopy at age 50 and I would not recommend that she needs to have this sooner than that.  She was quite reassured by that.  She has additional questions or concerns will certainly let me know.  We will see her back in 6 months for recheck.  Marissa Rico MD

## 2021-06-22 NOTE — PROGRESS NOTES
"Chief Complaint   Patient presents with     Urinary Tract Infection     Pt c/o recurring UTI. She has an odor and color is cloudy, her back hurts       HPI: Very pleasant 38-year-old female with a complicated UTI, presents today with approximately 10-day history of urinary tract infection symptoms.  She has had dysuria urgency and burning with low back pain.  Unfortunately, she went on the computer to have an electronic visit and was given Bactrim for 3 days which did not work.  She continues to have dysuria urgency and \"feeling gross\".    ROS: No fever.  No nausea or vomiting.  No rashes.    SH:    reports that she has been smoking.  She has a 15.00 pack-year smoking history. she has never used smokeless tobacco. She reports that she drinks about 0.6 oz of alcohol per week. She reports that she does not use drugs.      FH: The Patient's family history includes Alcohol abuse in her brother, father, maternal grandmother, paternal grandfather, and paternal grandmother; Allergic rhinitis in her mother, paternal grandmother, sister, and son; Arthritis in her father, maternal grandmother, mother, paternal grandfather, and paternal grandmother; Asthma in her brother; Breast cancer in her maternal aunt; Cancer in her cousin; Colon cancer (age of onset: 76) in her maternal grandmother; Coronary artery disease in her father and another family member; Dementia in an other family member; Depression in her mother and another family member; Diabetes in her paternal uncle and sister; Heart disease in her father, paternal aunt, paternal grandfather, and paternal uncle; Hyperlipidemia in her maternal grandmother and paternal grandfather; Hypertension in her father, maternal grandmother, and paternal grandfather; Ovarian cancer (age of onset: 76) in her maternal grandmother; Skin cancer in her maternal grandfather; Thyroid disease in her maternal aunt, maternal grandmother, and sister.     Meds:  Soraida has a current medication list " which includes the following prescription(s): cyclobenzaprine, fluoxetine, loratadine, naproxen sodium, and levofloxacin.    O:  BP 98/64   Pulse 88   Resp 16   Wt 181 lb 3 oz (82.2 kg)   SpO2 98%   BMI 28.38 kg/m    No CVA tenderness to palpation bilaterally.  No bladder tenderness to palpation    UA positive for trace blood    A/P:   1. Urinary tract infection-recurrent  - Urinalysis  - levoFLOXacin (LEVAQUIN) 750 MG tablet; Take 1 tablet (750 mg total) by mouth daily.  Dispense: 7 tablet; Refill: 0  - Culture, Urine

## 2021-07-03 NOTE — ADDENDUM NOTE
Addendum Note by Nette Alatorre CMA at 1/4/2019  2:00 PM     Author: Nette Alatorre CMA Service: -- Author Type: Certified Medical Assistant    Filed: 1/4/2019  5:29 PM Encounter Date: 1/4/2019 Status: Signed    : Nette Alatorre CMA (Certified Medical Assistant)    Addended by: NETTE ALATORRE on: 1/4/2019 05:29 PM        Modules accepted: Orders

## 2021-09-18 ENCOUNTER — HEALTH MAINTENANCE LETTER (OUTPATIENT)
Age: 41
End: 2021-09-18

## 2021-11-10 NOTE — TELEPHONE ENCOUNTER
Jennifer scheduled June 5 for a med check. She is requesting her inhaler.and was noticed that her fluoxetine was sent to the pharmacy   No

## 2021-11-23 ENCOUNTER — OFFICE VISIT (OUTPATIENT)
Dept: FAMILY MEDICINE | Facility: CLINIC | Age: 41
End: 2021-11-23
Payer: COMMERCIAL

## 2021-11-23 VITALS
TEMPERATURE: 98.2 F | BODY MASS INDEX: 28.09 KG/M2 | SYSTOLIC BLOOD PRESSURE: 124 MMHG | WEIGHT: 179 LBS | HEART RATE: 83 BPM | HEIGHT: 67 IN | DIASTOLIC BLOOD PRESSURE: 78 MMHG | OXYGEN SATURATION: 99 %

## 2021-11-23 DIAGNOSIS — Z98.890 NECK PAIN WITH HISTORY OF CERVICAL SPINAL SURGERY: Primary | ICD-10-CM

## 2021-11-23 DIAGNOSIS — M54.2 NECK PAIN WITH HISTORY OF CERVICAL SPINAL SURGERY: Primary | ICD-10-CM

## 2021-11-23 PROCEDURE — 99213 OFFICE O/P EST LOW 20 MIN: CPT | Performed by: NURSE PRACTITIONER

## 2021-11-23 RX ORDER — GABAPENTIN 300 MG/1
300 CAPSULE ORAL 3 TIMES DAILY PRN
Qty: 90 CAPSULE | Refills: 0 | Status: SHIPPED | OUTPATIENT
Start: 2021-11-23 | End: 2022-02-25

## 2021-11-23 RX ORDER — CYCLOBENZAPRINE HCL 10 MG
10 TABLET ORAL
COMMUNITY
Start: 2021-11-21 | End: 2022-02-25

## 2021-11-23 RX ORDER — NAPROXEN 500 MG/1
500 TABLET ORAL
COMMUNITY
Start: 2021-11-21 | End: 2022-10-24

## 2021-11-23 RX ORDER — PREDNISONE 20 MG/1
40 TABLET ORAL
COMMUNITY
Start: 2021-11-21 | End: 2021-11-26

## 2021-11-23 ASSESSMENT — MIFFLIN-ST. JEOR: SCORE: 1499.57

## 2021-11-23 NOTE — PROGRESS NOTES
Chief Complaint   Patient presents with     Neck Pain     Having left side neck pain. Was seen at urgent care on Sunday. Was told that she needed an MRI. That is why she is here today.        HPI: Patient presents today with complaints of left-sided neck pain radiating down the left arm.  She has associated tingling in her fingertips.  Pain is worse with laying down.  When she sits and stands completely upright she is asymptomatic.  Afebrile without chills.  Works in [employer].  Status post cervical laminectomy performed in 2006.  She feels like the left arm is a little bit weaker than the right, but she has not noticed any increased clumsiness or dexterity issues.  No chest pain, diaphoresis, nausea.    Patient was evaluated for this at the urgency room on 11/21/2021 and she was given a prescription for cyclobenzaprine, prednisone, and naproxen.  Cyclobenzaprine helps her to feel sedated so that she can get some sleep, but she still is having perpetual issues with neck pain.  She is right-hand dominant.      ROS:Review of Systems - negative except for what's listed in the HPI    SH: The Patient's  reports that she has been smoking. She has a 15.00 pack-year smoking history. She has never used smokeless tobacco. She reports current alcohol use of about 1.0 standard drink of alcohol per week. She reports that she does not use drugs.      FH: The Patient's family history includes Alcoholism in her brother, father, maternal grandmother, paternal grandfather, and paternal grandmother; Allergic rhinitis in her mother, paternal grandmother, sister, and son; Arthritis in her father, maternal grandmother, mother, paternal grandfather, and paternal grandmother; Asthma in her brother; Breast Cancer in her maternal aunt; Cancer in her cousin; Colon Cancer (age of onset: 76.00) in her maternal grandmother; Coronary Artery Disease in her father and another family member; Dementia in an other family member; Depression in her mother and  "another family member; Diabetes in her paternal uncle and sister; Heart Disease in her father, paternal aunt, paternal grandfather, and paternal uncle; Hyperlipidemia in her maternal grandmother and paternal grandfather; Hypertension in her father, maternal grandmother, and paternal grandfather; Ovarian Cancer (age of onset: 76.00) in her maternal grandmother; Skin Cancer in her maternal grandfather; Thyroid Disease in her maternal aunt, maternal grandmother, and sister.     Meds:    Current Outpatient Medications   Medication     cyclobenzaprine (FLEXERIL) 10 MG tablet     gabapentin (NEURONTIN) 300 MG capsule     loratadine (CLARITIN) 10 mg tablet     naproxen (NAPROSYN) 500 MG tablet     naproxen sodium (ALEVE) 220 MG tablet     predniSONE (DELTASONE) 20 MG tablet     albuterol (PROAIR HFA;PROVENTIL HFA;VENTOLIN HFA) 90 mcg/actuation inhaler     No current facility-administered medications for this visit.       O:  /78   Pulse 83   Temp 98.2  F (36.8  C) (Oral)   Ht 1.702 m (5' 7\")   Wt 81.2 kg (179 lb)   LMP 11/21/2021 (Exact Date)   SpO2 99%   BMI 28.04 kg/m      Physical Examination:   General appearance - alert, well appearing, and in no distress  Mental status - alert, oriented to person, place, and time  Lymphatics - no palpable lymphadenopathy  Chest - clear to auscultation, no wheezes, rales or rhonchi, symmetric air entry  Heart - normal rate and regular rhythm, S1 and S2 normal, no murmurs noted  Neurological - motor and sensory grossly normal bilaterally, normal muscle tone, no tremors, strength 5/5  Musculoskeletal -discomfort with palpation along left side of cervical spine.  Full range of motion in upper extremities.   strength in left hand about 95% the same as the right.  Extremities - peripheral pulses normal, no peripheral edema  Skin - normal coloration and turgor.      A/P:       ICD-10-CM    1. Neck pain with history of cervical spinal surgery  M54.2 MR Cervical Spine w/o " Contrast    Z98.890 gabapentin (NEURONTIN) 300 MG capsule       Neck pain with history of cervical spinal surgery    Given history of cervical surgery with pain/radicular symptoms, I think it would be a worthwhile to update the MRI of her cervical spine as recommended by the ED physician.  This was ordered.  Try gabapentin for discomfort.  No alcohol or driving with this.  Counseled about potential sedation side effects.    - MR Cervical Spine w/o Contrast  - gabapentin (NEURONTIN) 300 MG capsule  Dispense: 90 capsule; Refill: 0        Ancelmo Ross, CNP      This note has been dictated using voice recognition software. Any grammatical or context distortions are unintentional and inherent to the software.

## 2021-11-23 NOTE — PATIENT INSTRUCTIONS
I sent a medicine called gabapentin to your pharmacy to see if it helps with pain.      MRI of the cervical spine ordered.    Radiology scheduling should be contacting you, but their number is [phone] if you don't hear anything.      Patient Education     Gabapentin Oral Capsule 300 mg  Uses  This medicine is used for the following purposes:    menopausal symptoms    pain    restless leg syndrome    seizures    alcohol dependence    tremors  Instructions  Swallow the medicine without crushing or chewing it.  This medicine may be taken with or without food.  It is very important that you take the medicine at about the same time every day. It will work best if you do this.  Keep the medicine at room temperature. Avoid heat and direct light.  Do not take any antacid or vitamins with magnesium, calcium, aluminum, or iron for 2 hours before and 2 hours after taking this medicine.  It is important that you keep taking each dose of this medicine on time even if you are feeling well.  If you forget to take a dose on time, take it as soon as you remember. If it is almost time for the next dose, do not take the missed dose. Return to your normal dosing schedule. Do not take 2 doses of this medicine at one time.  Please tell your doctor and pharmacist about all the medicines you take. Include both prescription and over-the-counter medicines. Also tell them about any vitamins, herbal medicines, or anything else you take for your health.  Do not suddenly stop taking this medicine. Check with your doctor before stopping.  Cautions  Tell your doctor and pharmacist if you ever had an allergic reaction to a medicine. Symptoms of an allergic reaction can include trouble breathing, skin rash, itching, swelling, or severe dizziness.  Some patients taking this medicine have experienced serious side effects. Please speak with your doctor to understand the risks and benefits associated with this medicine.  Do not use the medication  any more than instructed.  If possible, avoid using with marijuana or other medicines that can cause dizziness or drowsiness. These include allergy/cold products, muscle relaxers, sleep aids, and pain relievers.  Your ability to stay alert or to react quickly may be impaired by this medicine. Do not drive or operate machinery until you know how this medicine will affect you.  Do not drink beverages with alcohol while on this medicine.  Family should check on the patient often. Call the doctor if patient becomes more depressed, has thoughts of suicide, or shows changes in behavior.  Call the doctor if there are any signs of confusion or unusual changes in behavior.  Tell the doctor or pharmacist if you are pregnant, planning to be pregnant, or breastfeeding.  Ask your pharmacist if this medicine can interact with any of your other medicines. Be sure to tell them about all the medicines you take.  Do not start or stop any other medicines without first speaking to your doctor or pharmacist.  This medicine should be used with caution in patients with breathing difficulties.  Call your doctor right away if you notice slow or shallow breathing.  Do not share this medicine with anyone who has not been prescribed this medicine.  This medicine can cause serious side effects in some patients. Important information from the [place] Food and Drug Administration (FDA) is available from your pharmacist. Please review it carefully with your pharmacist to understand the risks associated with this medicine.  Side Effects  The following is a list of some common side effects from this medicine. Please speak with your doctor about what you should do if you experience these or other side effects.    dizziness    drowsiness or sedation    lack of energy and tiredness  Call your doctor or get medical help right away if you notice any of these more serious side effects:    shallow, irregular breathing    fever    swelling in the neck or  throat  A few people may have an allergic reactions to this medicine. Symptoms can include difficulty breathing, skin rash, itching, swelling, or severe dizziness. If you notice any of these symptoms, seek medical help quickly.  Extra  Please speak with your doctor, nurse, or pharmacist if you have any questions about this medicine.  [email or web address]  IMPORTANT NOTE: This document tells you briefly how to take your medicine, but it does not tell you all there is to know about it.Your doctor or pharmacist may give you other documents about your medicine. Please talk to them if you have any questions.Always follow their advice. There is a more complete description of this medicine available in English.Scan this code on your smartphone or tablet or use the web address below. You can also ask your pharmacist for a printout. If you have any questions, please ask your pharmacist.     2021 [employer].

## 2021-12-03 ENCOUNTER — HOSPITAL ENCOUNTER (OUTPATIENT)
Dept: MRI IMAGING | Facility: CLINIC | Age: 41
Discharge: HOME | End: 2021-12-03
Attending: NURSE PRACTITIONER | Admitting: NURSE PRACTITIONER
Payer: COMMERCIAL

## 2021-12-03 DIAGNOSIS — Z98.890 NECK PAIN WITH HISTORY OF CERVICAL SPINAL SURGERY: ICD-10-CM

## 2021-12-03 DIAGNOSIS — M54.2 NECK PAIN WITH HISTORY OF CERVICAL SPINAL SURGERY: ICD-10-CM

## 2021-12-03 PROCEDURE — 72141 MRI NECK SPINE W/O DYE: CPT

## 2022-01-13 ENCOUNTER — IMMUNIZATION (OUTPATIENT)
Dept: NURSING | Facility: CLINIC | Age: 42
End: 2022-01-13
Payer: COMMERCIAL

## 2022-01-13 PROCEDURE — 91305 COVID-19,PF,PFIZER (12+ YRS): CPT

## 2022-01-13 PROCEDURE — 0054A COVID-19,PF,PFIZER (12+ YRS): CPT

## 2022-02-25 ENCOUNTER — OFFICE VISIT (OUTPATIENT)
Dept: FAMILY MEDICINE | Facility: CLINIC | Age: 42
End: 2022-02-25
Payer: COMMERCIAL

## 2022-02-25 VITALS
BODY MASS INDEX: 28.31 KG/M2 | DIASTOLIC BLOOD PRESSURE: 78 MMHG | SYSTOLIC BLOOD PRESSURE: 118 MMHG | HEART RATE: 97 BPM | HEIGHT: 67 IN | WEIGHT: 180.4 LBS | OXYGEN SATURATION: 98 %

## 2022-02-25 DIAGNOSIS — Z12.31 ENCOUNTER FOR SCREENING MAMMOGRAM FOR BREAST CANCER: ICD-10-CM

## 2022-02-25 DIAGNOSIS — J30.9 ALLERGIC RHINITIS, UNSPECIFIED SEASONALITY, UNSPECIFIED TRIGGER: ICD-10-CM

## 2022-02-25 DIAGNOSIS — L91.8 SKIN TAG: ICD-10-CM

## 2022-02-25 DIAGNOSIS — H54.8 LEGAL BLINDNESS, AS DEFINED IN USA: ICD-10-CM

## 2022-02-25 DIAGNOSIS — Z11.59 NEED FOR HEPATITIS C SCREENING TEST: ICD-10-CM

## 2022-02-25 DIAGNOSIS — F41.1 ANXIETY STATE: ICD-10-CM

## 2022-02-25 DIAGNOSIS — Z11.4 ENCOUNTER FOR SCREENING FOR HIV: ICD-10-CM

## 2022-02-25 DIAGNOSIS — Z00.00 ROUTINE GENERAL MEDICAL EXAMINATION AT A HEALTH CARE FACILITY: Primary | ICD-10-CM

## 2022-02-25 DIAGNOSIS — F33.0 MILD EPISODE OF RECURRENT MAJOR DEPRESSIVE DISORDER (H): ICD-10-CM

## 2022-02-25 LAB
ALBUMIN UR-MCNC: NEGATIVE MG/DL
APPEARANCE UR: CLEAR
BACTERIA #/AREA URNS HPF: ABNORMAL /HPF
BILIRUB UR QL STRIP: NEGATIVE
COLOR UR AUTO: YELLOW
GLUCOSE UR STRIP-MCNC: NEGATIVE MG/DL
HGB BLD-MCNC: 13.3 G/DL (ref 11.7–15.7)
HGB UR QL STRIP: ABNORMAL
HIV 1+2 AB+HIV1 P24 AG SERPL QL IA: NEGATIVE
KETONES UR STRIP-MCNC: ABNORMAL MG/DL
LEUKOCYTE ESTERASE UR QL STRIP: NEGATIVE
NITRATE UR QL: NEGATIVE
PH UR STRIP: 6 [PH] (ref 5–8)
RBC #/AREA URNS AUTO: ABNORMAL /HPF
SP GR UR STRIP: >=1.03 (ref 1–1.03)
SQUAMOUS #/AREA URNS AUTO: ABNORMAL /LPF
UROBILINOGEN UR STRIP-ACNC: 0.2 E.U./DL
WBC #/AREA URNS AUTO: ABNORMAL /HPF

## 2022-02-25 PROCEDURE — 81001 URINALYSIS AUTO W/SCOPE: CPT | Performed by: FAMILY MEDICINE

## 2022-02-25 PROCEDURE — 87624 HPV HI-RISK TYP POOLED RSLT: CPT | Performed by: FAMILY MEDICINE

## 2022-02-25 PROCEDURE — 87389 HIV-1 AG W/HIV-1&-2 AB AG IA: CPT | Performed by: FAMILY MEDICINE

## 2022-02-25 PROCEDURE — G0123 SCREEN CERV/VAG THIN LAYER: HCPCS | Performed by: FAMILY MEDICINE

## 2022-02-25 PROCEDURE — 99396 PREV VISIT EST AGE 40-64: CPT | Performed by: FAMILY MEDICINE

## 2022-02-25 PROCEDURE — 85018 HEMOGLOBIN: CPT | Performed by: FAMILY MEDICINE

## 2022-02-25 PROCEDURE — 86803 HEPATITIS C AB TEST: CPT | Performed by: FAMILY MEDICINE

## 2022-02-25 PROCEDURE — 36415 COLL VENOUS BLD VENIPUNCTURE: CPT | Performed by: FAMILY MEDICINE

## 2022-02-25 PROCEDURE — 80061 LIPID PANEL: CPT | Performed by: FAMILY MEDICINE

## 2022-02-25 ASSESSMENT — ANXIETY QUESTIONNAIRES
7. FEELING AFRAID AS IF SOMETHING AWFUL MIGHT HAPPEN: SEVERAL DAYS
7. FEELING AFRAID AS IF SOMETHING AWFUL MIGHT HAPPEN: SEVERAL DAYS
3. WORRYING TOO MUCH ABOUT DIFFERENT THINGS: SEVERAL DAYS
4. TROUBLE RELAXING: SEVERAL DAYS
5. BEING SO RESTLESS THAT IT IS HARD TO SIT STILL: SEVERAL DAYS
GAD7 TOTAL SCORE: 6
GAD7 TOTAL SCORE: 6
2. NOT BEING ABLE TO STOP OR CONTROL WORRYING: SEVERAL DAYS
6. BECOMING EASILY ANNOYED OR IRRITABLE: NOT AT ALL
GAD7 TOTAL SCORE: 6
1. FEELING NERVOUS, ANXIOUS, OR ON EDGE: SEVERAL DAYS

## 2022-02-25 ASSESSMENT — PATIENT HEALTH QUESTIONNAIRE - PHQ9
10. IF YOU CHECKED OFF ANY PROBLEMS, HOW DIFFICULT HAVE THESE PROBLEMS MADE IT FOR YOU TO DO YOUR WORK, TAKE CARE OF THINGS AT HOME, OR GET ALONG WITH OTHER PEOPLE: NOT DIFFICULT AT ALL
SUM OF ALL RESPONSES TO PHQ QUESTIONS 1-9: 5
SUM OF ALL RESPONSES TO PHQ QUESTIONS 1-9: 5

## 2022-02-25 NOTE — PROGRESS NOTES
SUBJECTIVE:   CC: Soraida Rousseau is an 41 year old woman who presents for preventive health visit.       Patient has been advised of split billing requirements and indicates understanding: Yes  Healthy Habits:     Bi-annual eye exam:  Yes    Dental care twice a year:  Yes    Sleep apnea or symptoms of sleep apnea:  None    Diet:  Regular (no restrictions)    Frequency of exercise:  None    Taking medications regularly:  Yes    Medication side effects:  None    PHQ-2 Total Score: 2    Additional concerns today:  No    Patient comes in today for physical exam.  She is overall feeling well.  She does have a history of anxiety and depression.  Please see PHQ-9 and NATALIE which are both completed in their entirety today.  She has been on and off of Prozac for many times in her lifetime.  Currently she has been off her Prozac for about the last year or so and feels like things are doing well.  She is not suicidal or homicidal.  It seems like her anxiety may be worse than her depression at this point but overall she feels like things are doing well.  She is constantly worried about her kids but actually her kids are doing much better than they have in the past.  She notes that her sleeping is not all that good she wakes up in the middle the night and has a hard time falling back to sleep but she gets about 5 to 6 hours of sleep and at this point does not feel like she needs any additional assistance with that.  She is due for mammogram we will order that today.  Her grandmother had colon cancer at the age of 70 and so she is wondering about when she should get a colonoscopy.  We talked about the fact that there are new recommendations now show that most people should get a colonoscopy at age 45.  I asked her to please call her insurance and find out if they cover colonoscopy at age 43.  She is wondering if she has been having some menopausal symptoms.  Her mom went through menopause and was done at age 53.  Patient's  menstrual cycles are still regular but she has had occasional hot flashes etc.  She is wondering if that could be why she is feeling just a little bit not herself and we discussed that that certainly could be.  She does have some skin tags on her neck that she is never had before and she was wondering if those are normal if there is something she needs to do about that.  She also notes that she has a edgard on her left breast that she is wondering what that is.  She does have a history of neck pain and has had neck surgery in the past.  She did have an exacerbation of the neck pain in November or so which was improved with steroids.  She overall feels like things are doing fine now and will continue to monitor her symptoms for now.  She did have another MRI done at the time and this was exacerbated with the pain and it seems to be relatively stable.  She does use an inhaler only when she needs it for her allergies.  These are seasonal in nature and she probably will start that within the next couple of months because of the spring allergies but otherwise she is doing well.  She does agree to HIV and hepatitis C testing today which will be drawn.    Today's PHQ-2 Score:   PHQ-2 ( 1999 Pfizer) 2/22/2022   Q1: Little interest or pleasure in doing things 1   Q2: Feeling down, depressed or hopeless 1   PHQ-2 Score 2   Q1: Little interest or pleasure in doing things Several days   Q2: Feeling down, depressed or hopeless Several days   PHQ-2 Score 2       Abuse: Current or Past (Physical, Sexual or Emotional) - No  Do you feel safe in your environment? Yes    Have you ever done Advance Care Planning? (For example, a Health Directive, POLST, or a discussion with a medical provider or your loved ones about your wishes): No, advance care planning information given to patient to review.  Patient declined advance care planning discussion at this time.    Social History     Tobacco Use     Smoking status: Current Some Day Smoker      Packs/day: 0.75     Years: 25.00     Pack years: 18.75     Smokeless tobacco: Never Used     Tobacco comment: quitting 2/26/22   Substance Use Topics     Alcohol use: Yes     Alcohol/week: 1.0 standard drink     Types: 1 Standard drinks or equivalent per week     Comment: Alcoholic Drinks/day: once a month     If you drink alcohol do you typically have >3 drinks per day or >7 drinks per week? No    Alcohol Use 2/25/2022   Prescreen: >3 drinks/day or >7 drinks/week? -   Prescreen: >3 drinks/day or >7 drinks/week? No       Reviewed orders with patient.  Reviewed health maintenance and updated orders accordingly - Yes      Breast Cancer Screening:    FHS-7:   Breast CA Risk Assessment (FHS-7) 1/4/2022 1/4/2022 2/22/2022   Did any of your first-degree relatives have breast or ovarian cancer? Yes Yes Yes   Did any of your relatives have bilateral breast cancer? No No No   Did any man in your family have breast cancer? No No No   Did any woman in your family have breast and ovarian cancer? No No No   Did any woman in your family have breast cancer before age 50 y? Yes Yes Yes   Do you have 2 or more relatives with breast and/or ovarian cancer? Yes Yes Yes   Do you have 2 or more relatives with breast and/or bowel cancer? Yes Yes Yes       Mammogram Screening - Offered annual screening and updated Health Maintenance for mutual plan based on risk factor consideration    Pertinent mammograms are reviewed under the imaging tab.    History of abnormal Pap smear: Patient had LSIL on Pap smear in 2017.  She did have colposcopy and had normal Pap smear in 2018.  She thinks that she had another Pap smear since then that was normal.  We will repeat Pap smear with HPV testing today.  PAP / HPV 4/5/2017 7/29/2015   PAP LSIL encompassing HPV/mild dysplasia/CIN1  Electronically signed by Rodney Jordan MD PhD on 4/12/2017 at 10:43 AM   Negative for squamous intraepithelial lesion or malignancy  Electronically signed by Neal  ДМИТРИЙ Erazo (ASC) on 8/5/2015 at  4:03 PM       Reviewed and updated as needed this visit by clinical staff   Tobacco  Allergies  Meds   Med Hx  Surg Hx  Fam Hx  Soc Hx        Reviewed and updated as needed this visit by Provider   Tobacco  Allergies  Meds   Med Hx  Surg Hx  Fam Hx  Soc Hx         Current Outpatient Medications:      naproxen (NAPROSYN) 500 MG tablet, Take 500 mg by mouth, Disp: , Rfl:      naproxen sodium (ALEVE) 220 MG tablet, [NAPROXEN SODIUM (ALEVE) 220 MG TABLET] Take 220 mg by mouth every evening., Disp: , Rfl:      Allergies   Allergen Reactions     Nitrofurantoin Monohyd/M-Cryst [Nitrofurantoin] Difficulty breathing and Nausea        Past Medical History:   Diagnosis Date     Anxiety      Low grade squamous intraepithelial lesion on cytologic smear of cervix (LGSIL) 2017    had colposcopy with Dr Lopez, nl pap 2/2018     Neck pain      Recurrent major depressive disorder, in full remission (H)         Past Surgical History:   Procedure Laterality Date     APPENDECTOMY  1991     CERVICAL LAMINECTOMY  8/2006    left C5-6 hemilaminectomy, microforaminotomy     COLPOSCOPY  04/2017    LSIL, colpo done, essentially benign, repap done 2/18 was ok, so back to yearly paps with me     OTHER SURGICAL HISTORY      elective abortionx4     OTHER SURGICAL HISTORY  08/2016    tubal removalDr Lopez        Family History   Problem Relation Age of Onset     Allergic rhinitis Mother      Arthritis Mother      Depression Mother      Alcoholism Father      Arthritis Father      Coronary Artery Disease Father      Hypertension Father      Heart Disease Father         enlarged heart     Allergic rhinitis Sister      Diabetes Sister      Thyroid Disease Sister      Alcoholism Brother      Asthma Brother      Alcoholism Maternal Grandmother      Arthritis Maternal Grandmother      Hypertension Maternal Grandmother      Hyperlipidemia Maternal Grandmother      Thyroid Disease Maternal Grandmother       Ovarian Cancer Maternal Grandmother 76     Colon Cancer Maternal Grandmother 76     Skin Cancer Maternal Grandfather      Alcoholism Paternal Grandmother      Allergic rhinitis Paternal Grandmother      Arthritis Paternal Grandmother      Alcoholism Paternal Grandfather      Arthritis Paternal Grandfather      Heart Disease Paternal Grandfather      Hyperlipidemia Paternal Grandfather      Hypertension Paternal Grandfather      Allergic rhinitis Son      Thyroid Disease Maternal Aunt      Breast Cancer Maternal Aunt         dx age 45     Heart Disease Paternal Aunt      Diabetes Paternal Uncle      Heart Disease Paternal Uncle      Cancer Cousin         Brain     Coronary Artery Disease Other      Depression Other         both paternal and maternal sides     Dementia Other         mggm     Cervical Cancer Maternal Great-Grandmother         found when she was pregnant        Social History     Socioeconomic History     Marital status: Single     Spouse name: Not on file     Number of children: 2     Years of education: 18     Highest education level: Not on file   Occupational History     Occupation: IT     Comment: State of [place]   Tobacco Use     Smoking status: Current Some Day Smoker     Packs/day: 0.75     Years: 25.00     Pack years: 18.75     Smokeless tobacco: Never Used     Tobacco comment: quitting 2/26/22   Vaping Use     Vaping Use: Never used   Substance and Sexual Activity     Alcohol use: Yes     Alcohol/week: 1.0 standard drink     Types: 1 Standard drinks or equivalent per week     Comment: Alcoholic Drinks/day: once a month     Drug use: No     Sexual activity: Yes     Partners: Male     Birth control/protection: Surgical     Comment: tubal    Other Topics Concern     Not on file   Social History Narrative     Not on file     Social Determinants of Health     Financial Resource Strain: Not on file   Food Insecurity: Not on file   Transportation Needs: Not on file   Physical Activity: Not on file  "  Stress: Not on file   Social Connections: Not on file   Intimate Partner Violence: Not on file   Housing Stability: Not on file        Immunization History   Administered Date(s) Administered     COVID-19,PF,Pfizer (12+ Yrs) 2021, 2021, 2022     Flu, Unspecified 10/15/2015     HepA-Adult 2018, 2018     HepB-Adult 2018, 07/10/2018, 2018     Influenza (IIV3) PF 2009     Influenza Vaccine IM > 6 months Valent IIV4 (Alfuria,Fluzone) 2018     MMR 1990     Rhogam 2002     Td (Adult), Adsorbed 1995, 2005     Tdap (Adacel,Boostrix) 2009, 2018        OB History    Para Term  AB Living   7 2 2 0 5 2   SAB IAB Ectopic Multiple Live Births   1 4 0 0 2      # Outcome Date GA Lbr Grayson/2nd Weight Sex Delivery Anes PTL Lv   7 IAB 11 7w0d             Birth Comments: no complications   6 SAB            5 IAB            4 IAB            3 IAB            2 Term            1 Term                   Review of Systems  See below     OBJECTIVE:   /78   Pulse 97   Ht 1.702 m (5' 7\")   Wt 81.8 kg (180 lb 6.4 oz)   LMP 2022 (Exact Date)   SpO2 98%   Breastfeeding No   BMI 28.25 kg/m    Physical Exam  REVIEW OF SYSTEMS:   Denies fever, chills, visual changes, fatigue, myalgias, nasal congestion, rhinorrhea, ear pain or discharge, sore throat, swollen glands, breast mass, nipple discharge, breast changes, abdominal pain, nausea, vomiting, diarrhea, constipation, cough, shortness of breath, chest pain, weight change, change in bowel habits, melena, rectal bleeding, dysuria, frequency, urgency, hematuria, polyuria, polydipsia, polyphagia, joint pain or swelling or erythema, edema, rash, weakness, paresthesias, vaginal discharge or bleeding or mood changes other than that mentioned above in HPI.   Remainder of review of systems was negative.      PHYSICAL EXAM:  On exam, patient is a WD, WN 41 year old female in NAD.  BP " "118/78   Pulse 97   Ht 1.702 m (5' 7\")   Wt 81.8 kg (180 lb 6.4 oz)   LMP 02/07/2022 (Exact Date)   SpO2 98%   Breastfeeding No   BMI 28.25 kg/m    Head normocephalic, atraumatic.  Eyes PERRL, ears TM s clear bilaterally.  Throat without significant erythemia or exudate.  Neck was supple, full range of motion. No significant lymphadenopathy or thyromegaly was appreciated.  Lungs clear to auscultation  Heart regular rate and rhythm.  Breast exam was done. No masses were appreciated. Axilla were clear bilaterally. No nipple discharge was appreciated. Self breast exam was reviewed and taught today.  Abdomen was soft, nontender, nondistended. No masses or organomegaly were palpated. Positive bowel sounds were appreciated.  Extremities with full range of motion of all 4 extremities were noted.  Deep tendon reflexes were equal and symmetrical. Motor and sensation were intact to both the upper and lower extremities.  Cranial nerves 2 through 12 were grossly intact.  EOM were intact.  Pelvic exam was done.  External genitalia appeared normal. Speculum was introduced and the Pap smear obtained. Bimanual exam revealed uterus to be normal size and adenexa without palpable masses.   A&O x3, thought processes congruent, non-tangential. No hallucinations/delusions. Insight/judgment: intact. Denies suicidal/homicidal ideations.  Patient does have numerous benign-appearing skin tags at the base of her neck.  Reassurance was given that I do not think any of them need additional evaluation.  On exam of the underside of her left breast she has a discoloration which measures about 1 cm in diameter at about the 5 o'clock position of the breast.  There is no tenderness there is no warmth to touch there is no mass or abnormality below the region.  It is right where the wire of her bra sits.    PHQ-9:  Last PHQ-9 2/25/2022   1.  Little interest or pleasure in doing things 1   2.  Feeling down, depressed, or hopeless 0   3.  Trouble " falling or staying asleep, or sleeping too much 2   4.  Feeling tired or having little energy 1   5.  Poor appetite or overeating 1   6.  Feeling bad about yourself 0   7.  Trouble concentrating 0   8.  Moving slowly or restless 0   Q9: Thoughts of better off dead/self-harm past 2 weeks 0   PHQ-9 Total Score 5   Difficulty at work, home, or with people -       GAD7:  NATALIE-7  2/25/2022   1. Feeling nervous, anxious, or on edge 1   2. Not being able to stop or control worrying 1   3. Worrying too much about different things 1   4. Trouble relaxing 1   5. Being so restless that it is hard to sit still 1   6. Becoming easily annoyed or irritable 0   7. Feeling afraid, as if something awful might happen 1   NATALIE-7 Total Score 6   If you checked any problems, how difficult have they made it for you to do your work, take care of things at home, or get along with other people? -         LABS:    Recent Results (from the past 240 hour(s))   Hemoglobin    Collection Time: 02/25/22  2:57 PM   Result Value Ref Range    Hemoglobin 13.3 11.7 - 15.7 g/dL   UA reflex to Microscopic and Culture    Collection Time: 02/25/22  2:57 PM    Specimen: Urine, Midstream   Result Value Ref Range    Color Urine Yellow Colorless, Straw, Light Yellow, Yellow    Appearance Urine Clear Clear    Glucose Urine Negative Negative mg/dL    Bilirubin Urine Negative Negative    Ketones Urine Trace (A) Negative mg/dL    Specific Gravity Urine >=1.030 1.005 - 1.030    Blood Urine Small (A) Negative    pH Urine 6.0 5.0 - 8.0    Protein Albumin Urine Negative Negative mg/dL    Urobilinogen Urine 0.2 0.2, 1.0 E.U./dL    Nitrite Urine Negative Negative    Leukocyte Esterase Urine Negative Negative   Urine Microscopic    Collection Time: 02/25/22  2:57 PM   Result Value Ref Range    Bacteria Urine Few (A) None Seen /HPF    RBC Urine None Seen 0-2 /HPF /HPF    WBC Urine 0-5 0-5 /HPF /HPF    Squamous Epithelials Urine Moderate (A) None Seen /LPF   HIV Antigen  Antibody Combo    Collection Time: 02/25/22  2:58 PM   Result Value Ref Range    HIV Antigen Antibody Combo Negative Negative         ASSESSMENT/PLAN:   ASSESSMENT: 41 year old female physical exam and pap smear.    PLAN:     Routine general medical examination at a health care facility [Z00.00]    1. Routine general medical examination at a health care facility  - Hemoglobin; Future  - Lipid panel reflex to direct LDL Fasting; Future  - UA reflex to Microscopic and Culture; Future  - Gynecologic Cytology (PAP)  - Hemoglobin  - Lipid panel reflex to direct LDL Fasting  - UA reflex to Microscopic and Culture  - Urine Microscopic    2. Anxiety    3. Mild episode of recurrent major depressive disorder (H)    4. Allergic rhinitis, unspecified seasonality, unspecified trigger    5. 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 Skin tag    7. Encounter for screening mammogram for breast cancer  - *MA Screening Digital Bilateral; Future    8. Need for hepatitis C screening test  - Hepatitis C antibody; Future  - Hepatitis C antibody    9. Encounter for screening for HIV  - HIV Antigen Antibody Combo; Future  - HIV Antigen Antibody Combo      Patient is a 41 year old female who is overall doing well.  She is overall feeling well.  She does have a history of anxiety depression is currently not on any medication.  She feels like overall things are doing well and does not feel like she needs to be on medicine at this time.  Please see PHQ-9 and NATALIE which are both completed in their entirety today.  She is not suicidal or homicidal.  She does have a history of allergies which she uses albuterol inhaler for when she needs it.  She plans to start using that within the next month or so for the spring allergies but overall she has been doing well and has not needed the inhaler for quite some time now.  She does have a diagnosis of being legally blind in her right eye.  This has been chronic and longstanding is not changing  at all.  She does have multiple skin tags at the base of her neck we discussed that these are not uncommon as one gets a little bit older.  I would not advise that they need further evaluation and patient was reassured by that.  If they change she certainly should let me know.  She also has a lesion underneath her left breast that reassurance was given about as well.  I think it may be some bruising from the underwire of her bra.  Mammogram is due and order was placed for that.  Hepatitis C and HIV testing was done today as well.  She will continue to monitor her neck pain which seems to be under fairly good control.  If they have additional concerns or questions will certainly let me know.  She will call her insurance regarding colonoscopy at the age of 43 especially given the fact that her grandmother had colon cancer at age 70.  Pap smear was repeated today as she did have an abnormal in 2017.  We will contact her with results of that when it returns.  She declines flu vaccination today.  She does smoke and is ready to quit.  She does have patches as well as lozenges at home and she plans on quitting tomorrow.  She does not think that she needs any discharge assistance from me currently.  If that changes in the future she certainly will let me know.  All of her questions and concerns were addressed today.  If she has additional problems or concerns should let me know.    Will contact her with the results of the labs when available.    Voice recognition software was used in the creation of this note. Any grammatical or nonsensical errors are due to inherent errors with the software and are not the intention of the writer.      Marissa Rico MD       Patient has been advised of split billing requirements and indicates understanding: Yes    COUNSELING:  Reviewed preventive health counseling, as reflected in patient instructions       Regular exercise       Healthy diet/nutrition       Consider Hep C screening  "for all patients one time for ages 18-79 years       HIV screeninx in teen years, 1x in adult years, and at intervals if high risk    Estimated body mass index is 28.25 kg/m  as calculated from the following:    Height as of this encounter: 1.702 m (5' 7\").    Weight as of this encounter: 81.8 kg (180 lb 6.4 oz).        She reports that she has been smoking. She has a 18.75 pack-year smoking history. She has never used smokeless tobacco.  Tobacco Cessation Action Plan:   Patient is planning to quit tomorrow.  She has lozenges as well as patches at home if she needs additional assistance from me will certainly let me know.       Counseling Resources:  ATP IV Guidelines  Pooled Cohorts Equation Calculator  Breast Cancer Risk Calculator  BRCA-Related Cancer Risk Assessment: FHS-7 Tool  FRAX Risk Assessment  ICSI Preventive Guidelines  Dietary Guidelines for Americans,   [employer] MyPlate  ASA Prophylaxis  Lung CA Screening    Marisas Rico MD  [facility]  Answers for HPI/ROS submitted by the patient on 2022  If you checked off any problems, how difficult have these problems made it for you to do your work, take care of things at home, or get along with other people?: Not difficult at all  PHQ9 TOTAL SCORE: 5  NATALIE 7 TOTAL SCORE: 6        "

## 2022-02-26 ASSESSMENT — PATIENT HEALTH QUESTIONNAIRE - PHQ9: SUM OF ALL RESPONSES TO PHQ QUESTIONS 1-9: 5

## 2022-02-26 ASSESSMENT — ANXIETY QUESTIONNAIRES: GAD7 TOTAL SCORE: 6

## 2022-02-28 LAB — HCV AB SERPL QL IA: NEGATIVE

## 2022-03-01 LAB
HUMAN PAPILLOMA VIRUS 16 DNA: NEGATIVE
HUMAN PAPILLOMA VIRUS 18 DNA: NEGATIVE
HUMAN PAPILLOMA VIRUS FINAL DIAGNOSIS: NORMAL
HUMAN PAPILLOMA VIRUS OTHER HR: NEGATIVE

## 2022-03-02 ENCOUNTER — MYC MEDICAL ADVICE (OUTPATIENT)
Dept: FAMILY MEDICINE | Facility: CLINIC | Age: 42
End: 2022-03-02
Payer: COMMERCIAL

## 2022-03-02 LAB
CHOLEST SERPL-MCNC: 192 MG/DL
FASTING STATUS PATIENT QL REPORTED: NORMAL
HDLC SERPL-MCNC: 52 MG/DL
LDLC SERPL CALC-MCNC: 115 MG/DL
TRIGL SERPL-MCNC: 123 MG/DL

## 2022-03-04 PROBLEM — R87.612 LOW GRADE SQUAMOUS INTRAEPITHELIAL LESION ON CYTOLOGIC SMEAR OF CERVIX (LGSIL): Status: ACTIVE | Noted: 2017-01-01

## 2022-03-04 LAB
BKR LAB AP GYN ADEQUACY: NORMAL
BKR LAB AP GYN INTERPRETATION: NORMAL
BKR LAB AP GYN OTHER FINDINGS: NORMAL
BKR LAB AP HPV REFLEX: NORMAL
BKR LAB AP LMP: NORMAL
BKR LAB AP PREVIOUS ABNL DX: NORMAL
BKR LAB AP PREVIOUS ABNORMAL: NORMAL
PATH REPORT.COMMENTS IMP SPEC: NORMAL
PATH REPORT.COMMENTS IMP SPEC: NORMAL
PATH REPORT.RELEVANT HX SPEC: NORMAL

## 2022-05-20 ENCOUNTER — HOSPITAL ENCOUNTER (OUTPATIENT)
Dept: MAMMOGRAPHY | Facility: CLINIC | Age: 42
Discharge: HOME | End: 2022-05-20
Attending: FAMILY MEDICINE | Admitting: FAMILY MEDICINE
Payer: COMMERCIAL

## 2022-05-20 DIAGNOSIS — Z12.31 ENCOUNTER FOR SCREENING MAMMOGRAM FOR BREAST CANCER: ICD-10-CM

## 2022-05-20 PROCEDURE — 77067 SCR MAMMO BI INCL CAD: CPT

## 2022-07-11 ENCOUNTER — MYC MEDICAL ADVICE (OUTPATIENT)
Dept: FAMILY MEDICINE | Facility: CLINIC | Age: 42
End: 2022-07-11

## 2022-07-11 DIAGNOSIS — J30.9 ALLERGIC RHINITIS, UNSPECIFIED SEASONALITY, UNSPECIFIED TRIGGER: Primary | ICD-10-CM

## 2022-07-11 RX ORDER — ALBUTEROL SULFATE 90 UG/1
2 AEROSOL, METERED RESPIRATORY (INHALATION) EVERY 6 HOURS
Qty: 18 G | Refills: 0 | Status: SHIPPED | OUTPATIENT
Start: 2022-07-11 | End: 2023-08-10

## 2022-07-11 NOTE — TELEPHONE ENCOUNTER
"Routing refill request to provider for review/approval because:  Drug not active on patient's medication list       Disp Refills Start End MUKUL   albuterol (PROAIR HFA;PROVENTIL HFA;VENTOLIN HFA) 90 mcg/actuation inhaler 1 Inhaler 2 12/25/2020  No   Sig - Route: Inhale 1-2 puffs every 6 (six) hours as needed for wheezing. - Inhalation   Sent to pharmacy as: albuterol sulfate HFA 90 mcg/actuation aerosol inhaler (PROAIR HFA;PROVENTIL HFA;VENTOLIN HFA)   Notes to Pharmacy: May substitute the equivalent medication per insurance preference.   E-Prescribing Status: Receipt confirmed by pharmacy (12/25/2020  3:39 PM CST)       albuterol (PROAIR HFA;PROVENTIL HFA;VENTOLIN HFA) 90 mcg/actuation inhaler [[id number]]    Electronically signed by: Janelle Crow RN on 12/25/20 1539 Status: Active   Ordering user: Janelle Crow RN 12/25/20 1539 Ordering provider: Marissa Rico MD   Authorized by: Marissa Rico MD   PRN reasons: wheezing   Frequency: Q6H PRN 12/25/20 - Until Discontinued Released by: Janelle Crow RN 12/25/20 1539   Diagnoses  Seasonal allergic rhinitis, unspecified trigger [J30.2]   Medication comments: May substitute the equivalent medication per insurance preference.       Last office visit provider:  2/25/22     Requested Prescriptions   Pending Prescriptions Disp Refills     albuterol (PROAIR HFA/PROVENTIL HFA/VENTOLIN HFA) 108 (90 Base) MCG/ACT inhaler 18 g 3     Sig: Inhale 2 puffs into the lungs every 6 hours       Asthma Maintenance Inhalers - Anticholinergics Failed - 7/11/2022  2:06 PM        Failed - Medication is active on med list        Passed - Patient is age 12 years or older        Passed - Recent (12 mo) or future (30 days) visit within the authorizing provider's specialty     Patient has had an office visit with the authorizing provider or a provider within the authorizing providers department within the previous 12 mos or has a future within next 30 days. See \"Patient " "Info\" tab in inbasket, or \"Choose Columns\" in Meds & Orders section of the refill encounter.             Short-Acting Beta Agonist Inhalers Protocol  Failed - 7/11/2022  2:06 PM        Failed - Medication is active on med list        Passed - Patient is age 12 or older        Passed - Recent (12 mo) or future (30 days) visit within the authorizing provider's specialty     Patient has had an office visit with the authorizing provider or a provider within the authorizing providers department within the previous 12 mos or has a future within next 30 days. See \"Patient Info\" tab in inbasket, or \"Choose Columns\" in Meds & Orders section of the refill encounter.                   Padma Lindo RN 07/11/22 2:09 PM  "

## 2022-07-11 NOTE — TELEPHONE ENCOUNTER
Chief Complaint   Patient presents with     Refill Request     albuterol (PROAIR HFA;PROVENTIL HFA;VENTOLIN HFA) 90 mcg/actuation inhaler    Routing to erx pool. Patient uses for seasonal allergies.  Keiry Langford RN on 7/11/2022 at 2:05 PM

## 2022-09-12 ASSESSMENT — PATIENT HEALTH QUESTIONNAIRE - PHQ9
SUM OF ALL RESPONSES TO PHQ QUESTIONS 1-9: 11
10. IF YOU CHECKED OFF ANY PROBLEMS, HOW DIFFICULT HAVE THESE PROBLEMS MADE IT FOR YOU TO DO YOUR WORK, TAKE CARE OF THINGS AT HOME, OR GET ALONG WITH OTHER PEOPLE: SOMEWHAT DIFFICULT
SUM OF ALL RESPONSES TO PHQ QUESTIONS 1-9: 11

## 2022-09-14 ENCOUNTER — VIRTUAL VISIT (OUTPATIENT)
Dept: FAMILY MEDICINE | Facility: CLINIC | Age: 42
End: 2022-09-14
Payer: COMMERCIAL

## 2022-09-14 DIAGNOSIS — F33.1 MODERATE EPISODE OF RECURRENT MAJOR DEPRESSIVE DISORDER (H): Primary | ICD-10-CM

## 2022-09-14 DIAGNOSIS — F41.1 GAD (GENERALIZED ANXIETY DISORDER): ICD-10-CM

## 2022-09-14 PROCEDURE — 99214 OFFICE O/P EST MOD 30 MIN: CPT | Mod: GT | Performed by: INTERNAL MEDICINE

## 2022-09-14 PROCEDURE — 96127 BRIEF EMOTIONAL/BEHAV ASSMT: CPT | Mod: GT | Performed by: INTERNAL MEDICINE

## 2022-09-14 RX ORDER — HYDROXYZINE PAMOATE 50 MG/1
50 CAPSULE ORAL 3 TIMES DAILY PRN
Qty: 60 CAPSULE | Refills: 0 | Status: SHIPPED | OUTPATIENT
Start: 2022-09-14 | End: 2022-10-24

## 2022-09-14 ASSESSMENT — PATIENT HEALTH QUESTIONNAIRE - PHQ9
10. IF YOU CHECKED OFF ANY PROBLEMS, HOW DIFFICULT HAVE THESE PROBLEMS MADE IT FOR YOU TO DO YOUR WORK, TAKE CARE OF THINGS AT HOME, OR GET ALONG WITH OTHER PEOPLE: SOMEWHAT DIFFICULT
SUM OF ALL RESPONSES TO PHQ QUESTIONS 1-9: 11

## 2022-09-14 ASSESSMENT — ENCOUNTER SYMPTOMS: NERVOUS/ANXIOUS: 1

## 2022-09-14 NOTE — PROGRESS NOTES
"Soraida is a 42 year old who is being evaluated via a billable video visit.      How would you like to obtain your AVS? MyChart  If the video visit is dropped, the invitation should be resent by: Send to e-mail at: [email or web address]  Will anyone else be joining your video visit? No          Assessment & Plan     Moderate episode of recurrent major depressive disorder (H)  History of anxiety and depression in the past  She has been coping well but recently due to some life stressors her symptoms have come back  PHQ-9 score is 11 today  NATALIE score is 22  She wants to explore the option of Wellbutrin  However Wellbutrin is not licensed for anxiety and paradoxically it can cause anxiety worse  She wanted to explore Wellbutrin as she was wondering if it would also help her with smoking cessation  She was on Prozac for a long time in the past on and off and tolerated the medication well  We decided to go back on Prozac  She was on 20 mg in the past  She is going to start back on this dose and if she does well on this we will continue the same dose at 4 weeks however if she is not having any improvement we will increase it to 40 mg  - FLUoxetine (PROZAC) 20 MG capsule; Take 1 capsule (20 mg) by mouth daily    NATALIE (generalized anxiety disorder)  Discussed about Vistaril and the side effects of the same  She also has trouble sleeping  This will help with both anxiety and sleeping on the Prozac should also help with this  - hydrOXYzine (VISTARIL) 50 MG capsule; Take 1 capsule (50 mg) by mouth 3 times daily as needed for anxiety      20 minutes spent on the date of the encounter doing chart review, history and exam, documentation and further activities per the note       BMI:   Estimated body mass index is 28.25 kg/m  as calculated from the following:    Height as of 2/25/22: 1.702 m (5' 7\").    Weight as of 2/25/22: 81.8 kg (180 lb 6.4 oz).           Return in about 4 weeks (around 10/12/2022).    Jorje Ibarra MD  M " [facility] ZHANNA Quigley is a 42 year old, presenting for the following health issues:  Anxiety      Anxiety    History of Present Illness       Mental Health Follow-up:  Patient presents to follow-up on Depression & Anxiety.Patient's depression since last visit has been:  Medium  The patient is not having other symptoms associated with depression.  Patient's anxiety since last visit has been:  Medium  The patient is having other symptoms associated with anxiety.  Any significant life events: other  Patient is feeling anxious or having panic attacks.  Patient has no concerns about alcohol or drug use.    She eats 2-3 servings of fruits and vegetables daily.She consumes 0 sweetened beverage(s) daily.She exercises with enough effort to increase her heart rate 10 to 19 minutes per day.  She exercises with enough effort to increase her heart rate 3 or less days per week.   She is taking medications regularly.    Today's PHQ-9         PHQ-9 Total Score: 11    PHQ-9 Q9 Thoughts of better off dead/self-harm past 2 weeks :   Not at all    How difficult have these problems made it for you to do your work, take care of things at home, or get along with other people: Somewhat difficult             Review of Systems   Psychiatric/Behavioral: The patient is nervous/anxious.             Objective           Vitals:  No vitals were obtained today due to virtual visit.    Physical Exam   GENERAL: Healthy, alert and no distress  EYES: Eyes grossly normal to inspection.  No discharge or erythema, or obvious scleral/conjunctival abnormalities.  RESP: No audible wheeze, cough, or visible cyanosis.  No visible retractions or increased work of breathing.    SKIN: Visible skin clear. No significant rash, abnormal pigmentation or lesions.  NEURO: Cranial nerves grossly intact.  Mentation and speech appropriate for age.  PSYCH: Mentation appears normal, affect normal/bright, judgement and insight intact, normal  speech and appearance well-groomed.                Video-Visit Details    Video Start Time: 930 AM    Type of service:  Video Visit    Video End Time:945 AM    Originating Location (pt. Location): Home    Distant Location (provider location):  [facility]     Platform used for Video Visit: [employer]athanael[employer]

## 2022-11-20 ENCOUNTER — HEALTH MAINTENANCE LETTER (OUTPATIENT)
Age: 42
End: 2022-11-20

## 2022-12-27 ENCOUNTER — MYC REFILL (OUTPATIENT)
Dept: FAMILY MEDICINE | Facility: CLINIC | Age: 42
End: 2022-12-27

## 2022-12-27 DIAGNOSIS — M54.2 NECK PAIN: ICD-10-CM

## 2022-12-28 RX ORDER — NAPROXEN 500 MG/1
500 TABLET ORAL 3 TIMES DAILY PRN
Qty: 60 TABLET | Refills: 0 | Status: SHIPPED | OUTPATIENT
Start: 2022-12-28

## 2022-12-28 NOTE — TELEPHONE ENCOUNTER
"Routing refill request to provider for review/approval because:  Labs not current:  Multiple    Last Written Prescription Date:  10/25/22  Last Fill Quantity: 60,  # refills: 0   Last office visit provider:  2/25/22     Requested Prescriptions   Pending Prescriptions Disp Refills     naproxen (NAPROSYN) 500 MG tablet 60 tablet 0     Sig: Take 1 tablet (500 mg) by mouth 3 times daily as needed for moderate pain (4-6)       NSAID Medications Failed - 12/28/2022  9:25 AM        Failed - Normal ALT on file in past 12 months     No lab results found.          Failed - Normal AST on file in past 12 months     No lab results found.          Failed - Normal CBC on file in past 12 months     Recent Labs   Lab Test 02/25/22  1457   HGB 13.3                 Failed - Normal serum creatinine on file in past 12 months     No lab results found.    Ok to refill medication if creatinine is low          Passed - Blood pressure under 140/90 in past 12 months     BP Readings from Last 3 Encounters:   02/25/22 118/78   11/23/21 124/78                 Passed - Recent (12 mo) or future (30 days) visit within the authorizing provider's specialty     Patient has had an office visit with the authorizing provider or a provider within the authorizing providers department within the previous 12 mos or has a future within next 30 days. See \"Patient Info\" tab in inbasket, or \"Choose Columns\" in Meds & Orders section of the refill encounter.              Passed - Patient is age 6-64 years        Passed - Medication is active on med list        Passed - No active pregnancy on record        Passed - No positive pregnancy test in past 12 months             Dayton Gomez RN 12/28/22 9:27 AM  "

## 2022-12-28 NOTE — TELEPHONE ENCOUNTER
Prescription sent to pharmacy electronically. Please call and let patient know this should be available for  at the pharmacy.    Please assist patient with scheduling a follow-up appointment.  She is due for physical exam at the very end of February and certainly should get that scheduled so that is on the books.  She is overdue for follow-up for this medication as well as her other medications at this time.

## 2023-03-07 ASSESSMENT — ENCOUNTER SYMPTOMS
CHILLS: 0
JOINT SWELLING: 0
DIZZINESS: 0
HEARTBURN: 0
ARTHRALGIAS: 0
EYE PAIN: 0
NERVOUS/ANXIOUS: 1
BREAST MASS: 0
MYALGIAS: 1
ABDOMINAL PAIN: 0
FEVER: 0
PALPITATIONS: 0
SHORTNESS OF BREATH: 0
PARESTHESIAS: 0
WEAKNESS: 0
HEADACHES: 1
NAUSEA: 0
HEMATURIA: 0
DYSURIA: 0
DIARRHEA: 0
SORE THROAT: 0
HEMATOCHEZIA: 0
FREQUENCY: 0
COUGH: 0
CONSTIPATION: 0

## 2023-03-12 ASSESSMENT — ANXIETY QUESTIONNAIRES
GAD7 TOTAL SCORE: 6
2. NOT BEING ABLE TO STOP OR CONTROL WORRYING: SEVERAL DAYS
GAD7 TOTAL SCORE: 6
7. FEELING AFRAID AS IF SOMETHING AWFUL MIGHT HAPPEN: SEVERAL DAYS
8. IF YOU CHECKED OFF ANY PROBLEMS, HOW DIFFICULT HAVE THESE MADE IT FOR YOU TO DO YOUR WORK, TAKE CARE OF THINGS AT HOME, OR GET ALONG WITH OTHER PEOPLE?: SOMEWHAT DIFFICULT
3. WORRYING TOO MUCH ABOUT DIFFERENT THINGS: SEVERAL DAYS
5. BEING SO RESTLESS THAT IT IS HARD TO SIT STILL: NOT AT ALL
IF YOU CHECKED OFF ANY PROBLEMS ON THIS QUESTIONNAIRE, HOW DIFFICULT HAVE THESE PROBLEMS MADE IT FOR YOU TO DO YOUR WORK, TAKE CARE OF THINGS AT HOME, OR GET ALONG WITH OTHER PEOPLE: SOMEWHAT DIFFICULT
4. TROUBLE RELAXING: SEVERAL DAYS
7. FEELING AFRAID AS IF SOMETHING AWFUL MIGHT HAPPEN: SEVERAL DAYS
6. BECOMING EASILY ANNOYED OR IRRITABLE: SEVERAL DAYS
1. FEELING NERVOUS, ANXIOUS, OR ON EDGE: SEVERAL DAYS

## 2023-03-13 ENCOUNTER — OFFICE VISIT (OUTPATIENT)
Dept: FAMILY MEDICINE | Facility: CLINIC | Age: 43
End: 2023-03-13
Payer: COMMERCIAL

## 2023-03-13 VITALS
BODY MASS INDEX: 29.35 KG/M2 | HEIGHT: 67 IN | OXYGEN SATURATION: 96 % | RESPIRATION RATE: 16 BRPM | WEIGHT: 187 LBS | DIASTOLIC BLOOD PRESSURE: 76 MMHG | HEART RATE: 96 BPM | SYSTOLIC BLOOD PRESSURE: 110 MMHG | TEMPERATURE: 99.1 F

## 2023-03-13 DIAGNOSIS — Z80.0 FAMILY HISTORY OF COLON CANCER: ICD-10-CM

## 2023-03-13 DIAGNOSIS — Z00.00 ROUTINE GENERAL MEDICAL EXAMINATION AT A HEALTH CARE FACILITY: Primary | ICD-10-CM

## 2023-03-13 DIAGNOSIS — F33.0 MILD EPISODE OF RECURRENT MAJOR DEPRESSIVE DISORDER (H): ICD-10-CM

## 2023-03-13 DIAGNOSIS — Z12.11 SPECIAL SCREENING FOR MALIGNANT NEOPLASMS, COLON: ICD-10-CM

## 2023-03-13 DIAGNOSIS — J30.9 ALLERGIC RHINITIS, UNSPECIFIED SEASONALITY, UNSPECIFIED TRIGGER: ICD-10-CM

## 2023-03-13 DIAGNOSIS — D48.5 NEOPLASM OF UNCERTAIN BEHAVIOR OF SKIN: ICD-10-CM

## 2023-03-13 DIAGNOSIS — H54.8 LEGAL BLINDNESS, AS DEFINED IN USA: ICD-10-CM

## 2023-03-13 DIAGNOSIS — F41.1 ANXIETY STATE: ICD-10-CM

## 2023-03-13 DIAGNOSIS — M54.2 CERVICALGIA: ICD-10-CM

## 2023-03-13 DIAGNOSIS — Z12.31 ENCOUNTER FOR SCREENING MAMMOGRAM FOR BREAST CANCER: ICD-10-CM

## 2023-03-13 LAB
ALBUMIN UR-MCNC: NEGATIVE MG/DL
APPEARANCE UR: CLEAR
BACTERIA #/AREA URNS HPF: NORMAL /HPF
BILIRUB UR QL STRIP: NEGATIVE
COLOR UR AUTO: YELLOW
GLUCOSE UR STRIP-MCNC: NEGATIVE MG/DL
HGB BLD-MCNC: 13.3 G/DL (ref 11.7–15.7)
HGB UR QL STRIP: ABNORMAL
KETONES UR STRIP-MCNC: NEGATIVE MG/DL
LEUKOCYTE ESTERASE UR QL STRIP: NEGATIVE
NITRATE UR QL: NEGATIVE
PH UR STRIP: 6 [PH] (ref 5–8)
RBC #/AREA URNS AUTO: NORMAL /HPF
SP GR UR STRIP: 1.01 (ref 1–1.03)
UROBILINOGEN UR STRIP-ACNC: 0.2 E.U./DL
WBC #/AREA URNS AUTO: NORMAL /HPF

## 2023-03-13 PROCEDURE — 36415 COLL VENOUS BLD VENIPUNCTURE: CPT | Performed by: FAMILY MEDICINE

## 2023-03-13 PROCEDURE — 85018 HEMOGLOBIN: CPT | Performed by: FAMILY MEDICINE

## 2023-03-13 PROCEDURE — 99214 OFFICE O/P EST MOD 30 MIN: CPT | Mod: 25 | Performed by: FAMILY MEDICINE

## 2023-03-13 PROCEDURE — 80053 COMPREHEN METABOLIC PANEL: CPT | Performed by: FAMILY MEDICINE

## 2023-03-13 PROCEDURE — 99396 PREV VISIT EST AGE 40-64: CPT | Performed by: FAMILY MEDICINE

## 2023-03-13 PROCEDURE — 81001 URINALYSIS AUTO W/SCOPE: CPT | Performed by: FAMILY MEDICINE

## 2023-03-13 PROCEDURE — 80061 LIPID PANEL: CPT | Performed by: FAMILY MEDICINE

## 2023-03-13 ASSESSMENT — ENCOUNTER SYMPTOMS
HEADACHES: 1
FEVER: 0
BREAST MASS: 0
PALPITATIONS: 0
CONSTIPATION: 0
MYALGIAS: 1
DIZZINESS: 0
NERVOUS/ANXIOUS: 1
COUGH: 0
PARESTHESIAS: 0
DYSURIA: 0
WEAKNESS: 0
HEMATOCHEZIA: 0
HEARTBURN: 0
CHILLS: 0
NAUSEA: 0
EYE PAIN: 0
FREQUENCY: 0
ABDOMINAL PAIN: 0
SHORTNESS OF BREATH: 0
HEMATURIA: 0
ARTHRALGIAS: 0
JOINT SWELLING: 0
DIARRHEA: 0
SORE THROAT: 0

## 2023-03-13 ASSESSMENT — PATIENT HEALTH QUESTIONNAIRE - PHQ9
SUM OF ALL RESPONSES TO PHQ QUESTIONS 1-9: 6
10. IF YOU CHECKED OFF ANY PROBLEMS, HOW DIFFICULT HAVE THESE PROBLEMS MADE IT FOR YOU TO DO YOUR WORK, TAKE CARE OF THINGS AT HOME, OR GET ALONG WITH OTHER PEOPLE: NOT DIFFICULT AT ALL
SUM OF ALL RESPONSES TO PHQ QUESTIONS 1-9: 6

## 2023-03-13 ASSESSMENT — PAIN SCALES - GENERAL: PAINLEVEL: NO PAIN (0)

## 2023-03-13 NOTE — PROGRESS NOTES
SUBJECTIVE:   CC: Soraida is an 42 year old who presents for preventive health visit.   Patient has been advised of split billing requirements and indicates understanding: Yes  Healthy Habits:     Getting at least 3 servings of Calcium per day:  Yes    Bi-annual eye exam:  Yes    Dental care twice a year:  Yes    Sleep apnea or symptoms of sleep apnea:  None    Diet:  Regular (no restrictions)    Frequency of exercise:  1 day/week    Duration of exercise:  Less than 15 minutes    Taking medications regularly:  Yes    Medication side effects:  None    PHQ-2 Total Score: 2    Additional concerns today:  No    Patient comes in today for physical exam.  She is overall doing well.  She does have a history of depression and anxiety which are overall doing fine.  Please see PHQ-9 and NATALIE which are both completed in their entirety today.  She has been on and off Prozac pretty much her entire life.  Currently she is off of it.  She feels like overall things are doing okay.  She did get bad diarrhea from the Prozac so she stopped it.  She seems to be doing fine emotionally now.  Her kids are in a much better spot on her causing her much less anxiety and that makes a huge difference in terms of her overall health.  She did get engaged she is planning a wedding in .  Her fiancé is quite helpful for her as well.  She does have Vistaril at home that she takes if she needs it for anxiety and depression.  She may be needs it once every 2 months or so.  She overall feels like things are doing well.  She is not suicidal or homicidal and would very much like to continue to monitor things rather than start any medication at this point.  She does have a history of allergies and she uses an inhaler infrequently for those.  It is a seasonal allergy.  We did check on her inhaler and it is not  today.  She does understand they do  so she needs to watch that and make sure that she gets a new one when it does .  She  sometimes does use Naprosyn for her neck pain but she is also found that it has been helpful for her in terms of dysmenorrhea.  She is using prescription strength Naprosyn when she does that and she knows not to use the Aleve at the same time.  She did have COVID on 2/16/2023 and therefore does not want to pursue a COVID vaccination at this time.  She may get 1 in the future.  She did get Paxlovid and was pretty sick with her COVID but she seems to be recovered now.  She is wanting about getting a colonoscopy.  She will turn 43 in about a month.  She also has a grandmother who had colon cancer at the age of 70.  She is going to call her insurance regarding getting a colonoscopy but also I will place referral today and that we can at least get it scheduled.  She is also wondering about going to dermatology again.  She has not been for several years.  She did have some sun spots and some other lesions that were removed in the past and they told her to follow-up but she never did.  She now has a mole on her face that is getting a little bit darker and she also has a lot of skin tags that she would like evaluation for is wondering if I can place a referral for dermatology.  She did quit smoking on 7/15/2022 and she does chew gum occasionally but overall feels really good about the fact that she quit smoking.  She is not vaping either.  She declines flu vaccination as well as the COVID as mentioned above.  Last Pap smear was 2/25/2022 and therefore will not be repeated today.  He does need repeat in 3 years.  Last mammogram was done on 5/20/2022 I will place another order today for to be done in a year which would be 5/21/2023.    Today's PHQ-2 Score:   PHQ-2 ( 1999 Pfizer) 3/7/2023   Q1: Little interest or pleasure in doing things 1   Q2: Feeling down, depressed or hopeless 1   PHQ-2 Score 2   Q1: Little interest or pleasure in doing things Several days   Q2: Feeling down, depressed or hopeless Several days   PHQ-2  Score 2           Social History     Tobacco Use     Smoking status: Former     Packs/day: 0.50     Years: 25.00     Pack years: 12.50     Types: Cigarettes     Passive exposure: Past     Smokeless tobacco: Never     Tobacco comments:     Quit July 2022. Pt has 1-2 cigarettes at social gatherings.    Substance Use Topics     Alcohol use: Yes     Alcohol/week: 1.0 standard drink     Types: 1 Standard drinks or equivalent per week     Comment: Alcoholic Drinks/day: once a month         Alcohol Use 3/7/2023   Prescreen: >3 drinks/day or >7 drinks/week? No       Reviewed orders with patient.  Reviewed health maintenance and updated orders accordingly - Yes    Breast Cancer Screening:    FHS-7:   Breast CA Risk Assessment (FHS-7) 1/4/2022 1/4/2022 2/22/2022 5/20/2022 3/7/2023   Did any of your first-degree relatives have breast or ovarian cancer? Yes Yes Yes No Yes   Did any of your relatives have bilateral breast cancer? No No No No Unknown   Did any man in your family have breast cancer? No No No No No   Did any woman in your family have breast and ovarian cancer? No No No No No   Did any woman in your family have breast cancer before age 50 y? Yes Yes Yes Yes Yes   Do you have 2 or more relatives with breast and/or ovarian cancer? Yes Yes Yes Yes Yes   Do you have 2 or more relatives with breast and/or bowel cancer? Yes Yes Yes Yes No       Mammogram Screening - Offered annual screening and updated Health Maintenance for mutual plan based on risk factor consideration    Pertinent mammograms are reviewed under the imaging tab.    History of abnormal Pap smear: NO - age 30- 65 PAP every 3 years recommended  PAP / HPV Latest Ref Rng & Units 2/25/2022 4/5/2017 7/29/2015   PAP   Negative for Intraepithelial Lesion or Malignancy (NILM) LSIL encompassing HPV/mild dysplasia/CIN1  Electronically signed by Rodney Jordan MD PhD on 4/12/2017 at 10:43 AM   Negative for squamous intraepithelial lesion or  malignancy  Electronically signed by Wiliam De Jesus CT (ASCP) on 2015 at  4:03 PM     HPV16 Negative Negative - -   HPV18 Negative Negative - -   HRHPV Negative Negative - -     Reviewed and updated as needed this visit by clinical staff   Tobacco  Allergies  Meds  Problems  Med Hx  Surg Hx  Fam Hx          Reviewed and updated as needed this visit by Provider   Tobacco  Allergies  Meds  Problems  Med Hx  Surg Hx  Fam Hx           Current Outpatient Medications:      albuterol (PROAIR HFA/PROVENTIL HFA/VENTOLIN HFA) 108 (90 Base) MCG/ACT inhaler, Inhale 2 puffs into the lungs every 6 hours, Disp: 18 g, Rfl: 0     hydrOXYzine (VISTARIL) 50 MG capsule, Take 1 capsule (50 mg) by mouth 3 times daily as needed for anxiety, Disp: 60 capsule, Rfl: 2     naproxen (NAPROSYN) 500 MG tablet, Take 1 tablet (500 mg) by mouth 3 times daily as needed for moderate pain (4-6), Disp: 60 tablet, Rfl: 0     Allergies   Allergen Reactions     Nitrofurantoin Monohyd/M-Cryst [Nitrofurantoin] Difficulty breathing and Nausea        Past Medical History:   Diagnosis Date     Anxiety      Low grade squamous intraepithelial lesion on cytologic smear of cervix (LGSIL) 2017    had colposcopy with Dr Lopez, nl pap 2018     Neck pain      Recurrent major depressive disorder, in full remission (H)         Past Surgical History:   Procedure Laterality Date     APPENDECTOMY       CERVICAL LAMINECTOMY  2006    left C5-6 hemilaminectomy, microforaminotomy     COLPOSCOPY  2017    LSIL, colpo done, essentially benign, repap done  was ok, so back to yearly paps with me     elective       x4     tubal removal  2016    Dr Lopez        Family History   Problem Relation Age of Onset     Allergic rhinitis Mother      Arthritis Mother      Depression Mother      Thyroid Disease Mother      Alcoholism Father      Arthritis Father      Coronary Artery Disease Father      Hypertension Father      Heart Disease  Father         enlarged heart     Allergic rhinitis Sister      Diabetes Sister      Thyroid Disease Sister      Alcoholism Brother      Asthma Brother      Alcoholism Maternal Grandmother      Arthritis Maternal Grandmother      Hypertension Maternal Grandmother      Hyperlipidemia Maternal Grandmother      Thyroid Disease Maternal Grandmother      Ovarian Cancer Maternal Grandmother 76     Colon Cancer Maternal Grandmother      Skin Cancer Maternal Grandfather      Alcoholism Paternal Grandmother      Allergic rhinitis Paternal Grandmother      Arthritis Paternal Grandmother      Alcoholism Paternal Grandfather      Arthritis Paternal Grandfather      Heart Disease Paternal Grandfather      Hyperlipidemia Paternal Grandfather      Hypertension Paternal Grandfather      Coronary Artery Disease Paternal Grandfather      Allergic rhinitis Son      Thyroid Disease Maternal Aunt      Breast Cancer Maternal Aunt         dx age 45     Heart Disease Paternal Aunt      Diabetes Paternal Uncle      Heart Disease Paternal Uncle      Cancer Cousin         [place]     Cervical Cancer Maternal Great-Grandmother         found when she was pregnant     Coronary Artery Disease Other      Depression Other         both paternal and maternal sides     Dementia Other         mggm        Social History     Socioeconomic History     Marital status: Single     Spouse name: SHIVA Chan     Number of children: 2     Years of education: 18     Highest education level: Not on file   Occupational History     Occupation: IT     Comment: State of [place]   Tobacco Use     Smoking status: Former     Packs/day: 0.50     Years: 25.00     Pack years: 12.50     Types: Cigarettes     Passive exposure: Past     Smokeless tobacco: Never     Tobacco comments:     Quit July 2022. Pt has 1-2 cigarettes at social gatherings.    Vaping Use     Vaping Use: Never used   Substance and Sexual Activity     Alcohol use: Yes     Alcohol/week: 1.0 standard drink      Types: 1 Standard drinks or equivalent per week     Comment: Alcoholic Drinks/day: once a month     Drug use: No     Sexual activity: Yes     Partners: Male     Birth control/protection: Surgical     Comment: tubal    Other Topics Concern     Not on file   Social History Narrative     Not on file     Social Determinants of Health     Financial Resource Strain: Not on file   Food Insecurity: Not on file   Transportation Needs: Not on file   Physical Activity: Not on file   Stress: Not on file   Social Connections: Not on file   Intimate Partner Violence: Not on file   Housing Stability: Not on file        Immunization History   Administered Date(s) Administered     COVID-19 Vaccine 12+ (Pfizer ) 2022     COVID-19 Vaccine 12+ (Pfizer) 2021, 2021     Flu, Unspecified 10/15/2015     HepA-Adult 2018, 2018     Hepatitis B, Adult 2018, 07/10/2018, 2018     Influenza (IIV3) PF 2009     Influenza Vaccine >6 months (Alfuria,Fluzone) 2018     MMR 1990     Rhogam 2002     Td (Adult), Adsorbed 1995, 2005     Tdap (Adacel,Boostrix) 2009, 2018        OB History    Para Term  AB Living   7 2 2 0 5 2   SAB IAB Ectopic Multiple Live Births   1 4 0 0 2      # Outcome Date GA Lbr Grayson/2nd Weight Sex Delivery Anes PTL Lv   7 IAB 11 7w0d             Birth Comments: no complications   6 SAB            5 IAB            4 IAB            3 IAB            2 Term            1 Term                   Review of Systems   Constitutional: Negative for chills and fever.   HENT: Negative for congestion, ear pain, hearing loss and sore throat.    Eyes: Negative for pain and visual disturbance.   Respiratory: Negative for cough and shortness of breath.    Cardiovascular: Negative for chest pain, palpitations and peripheral edema.   Gastrointestinal: Negative for abdominal pain, constipation, diarrhea, heartburn, hematochezia and nausea.  "  Breasts:  Negative for tenderness, breast mass and discharge.   Genitourinary: Negative for dysuria, frequency, genital sores, hematuria, pelvic pain, urgency, vaginal bleeding and vaginal discharge.   Musculoskeletal: Positive for myalgias. Negative for arthralgias and joint swelling.   Skin: Negative for rash.   Neurological: Positive for headaches. Negative for dizziness, weakness and paresthesias.   Psychiatric/Behavioral: Positive for mood changes. The patient is nervous/anxious.         OBJECTIVE:   /76   Pulse 96   Temp 99.1  F (37.3  C) (Oral)   Resp 16   Ht 1.695 m (5' 6.75\")   Wt 84.8 kg (187 lb)   LMP 02/23/2023 (Exact Date)   SpO2 96%   Breastfeeding No   BMI 29.51 kg/m    Physical Exam  REVIEW OF SYSTEMS:   Denies fever, chills, visual changes, fatigue, myalgias, nasal congestion, rhinorrhea, ear pain or discharge, sore throat, swollen glands, breast mass, nipple discharge, breast changes, abdominal pain, nausea, vomiting, diarrhea, constipation, cough, shortness of breath, chest pain, weight change, change in bowel habits, melena, rectal bleeding, dysuria, frequency, urgency, hematuria, polyuria, polydipsia, polyphagia, joint pain or swelling or erythema, edema, rash, weakness, paresthesias, vaginal discharge or bleeding or mood changes other than that mentioned above in HPI.   Remainder of review of systems was negative.      PHYSICAL EXAM:  On exam, patient is a WD, WN 42 year old female in [facility].  /76   Pulse 96   Temp 99.1  F (37.3  C) (Oral)   Resp 16   Ht 1.695 m (5' 6.75\")   Wt 84.8 kg (187 lb)   LMP 02/23/2023 (Exact Date)   SpO2 96%   Breastfeeding No   BMI 29.51 kg/m    Head normocephalic, atraumatic.  Eyes PERRL, ears TM s clear bilaterally.  Throat without significant erythemia or exudate.  Neck was supple, full range of motion. No significant lymphadenopathy or thyromegaly was appreciated.  Lungs clear to auscultation  Heart regular rate and rhythm.  Breast " exam was done. No masses were appreciated. Axilla were clear bilaterally. No nipple discharge was appreciated.   Abdomen was soft, nontender, nondistended. No masses or organomegaly were palpated. Positive bowel sounds were appreciated.  Extremities with full range of motion of all 4 extremities were noted.  Deep tendon reflexes were equal and symmetrical. Motor and sensation were intact to both the upper and lower extremities.  Cranial nerves 2 through 12 were grossly intact.  EOM were intact.  Pelvic exam was done.  External genitalia appeared normal. Bimanual exam revealed uterus to be normal size and adenexa without palpable masses.   A&O x3, thought processes congruent, non-tangential. No hallucinations/delusions. Insight/judgment: intact. Denies suicidal/homicidal ideations.    PHQ-9:  Last PHQ-9 3/13/2023   1.  Little interest or pleasure in doing things 1   2.  Feeling down, depressed, or hopeless 1   3.  Trouble falling or staying asleep, or sleeping too much 2   4.  Feeling tired or having little energy 1   5.  Poor appetite or overeating 1   6.  Feeling bad about yourself 0   7.  Trouble concentrating 0   8.  Moving slowly or restless 0   Q9: Thoughts of better off dead/self-harm past 2 weeks 0   PHQ-9 Total Score 6   Difficulty at work, home, or with people -       GAD7:  NATALIE-7  3/12/2023   1. Feeling nervous, anxious, or on edge 1   2. Not being able to stop or control worrying 1   3. Worrying too much about different things 1   4. Trouble relaxing 1   5. Being so restless that it is hard to sit still 0   6. Becoming easily annoyed or irritable 1   7. Feeling afraid, as if something awful might happen 1   NATALIE-7 Total Score 6   If you checked any problems, how difficult have they made it for you to do your work, take care of things at home, or get along with other people? Somewhat difficult         LABS:    Recent Results (from the past 240 hour(s))   Hemoglobin    Collection Time: 03/13/23  5:55 PM    Result Value Ref Range    Hemoglobin 13.3 11.7 - 15.7 g/dL   Lipid panel reflex to direct LDL Fasting    Collection Time: 03/13/23  5:55 PM   Result Value Ref Range    Cholesterol 232 (H) <200 mg/dL    Triglycerides 169 (H) <150 mg/dL    Direct Measure HDL 54 >=50 mg/dL    LDL Cholesterol Calculated 144 (H) <=100 mg/dL    Non HDL Cholesterol 178 (H) <130 mg/dL   UA Macroscopic with reflex to Microscopic and Culture    Collection Time: 03/13/23  5:55 PM    Specimen: Urine, NOS   Result Value Ref Range    Color Urine Yellow Colorless, Straw, Light Yellow, Yellow    Appearance Urine Clear Clear    Glucose Urine Negative Negative mg/dL    Bilirubin Urine Negative Negative    Ketones Urine Negative Negative mg/dL    Specific Gravity Urine 1.015 1.005 - 1.030    Blood Urine Trace (A) Negative    pH Urine 6.0 5.0 - 8.0    Protein Albumin Urine Negative Negative mg/dL    Urobilinogen Urine 0.2 0.2, 1.0 E.U./dL    Nitrite Urine Negative Negative    Leukocyte Esterase Urine Negative Negative   Comprehensive metabolic panel    Collection Time: 03/13/23  5:55 PM   Result Value Ref Range    Sodium 140 136 - 145 mmol/L    Potassium 3.8 3.4 - 5.3 mmol/L    Chloride 104 98 - 107 mmol/L    Carbon Dioxide (CO2) 23 22 - 29 mmol/L    Anion Gap 13 7 - 15 mmol/L    Urea Nitrogen 10.6 6.0 - 20.0 mg/dL    Creatinine 0.80 0.51 - 0.95 mg/dL    Calcium 9.6 8.6 - 10.0 mg/dL    Glucose 59 (L) 70 - 99 mg/dL    Alkaline Phosphatase 60 35 - 104 U/L    AST 16 10 - 35 U/L    ALT 16 10 - 35 U/L    Protein Total 7.4 6.4 - 8.3 g/dL    Albumin 4.8 3.5 - 5.2 g/dL    Bilirubin Total 0.3 <=1.2 mg/dL    GFR Estimate >90 >60 mL/min/1.73m2   UA Microscopic with Reflex to Culture    Collection Time: 03/13/23  5:55 PM   Result Value Ref Range    Bacteria Urine None Seen None Seen /HPF    RBC Urine 0-2 0-2 /HPF /HPF    WBC Urine None Seen 0-5 /HPF /HPF         ASSESSMENT/PLAN:   ASSESSMENT: 42 year old female physical exam.    PLAN:     Routine general medical  examination at a health care facility [Z00.00]    1. Routine general medical examination at a health care facility  - Hemoglobin; Future  - Lipid panel reflex to direct LDL Fasting; Future  - UA Macroscopic with reflex to Microscopic and Culture; Future  - Hemoglobin  - Lipid panel reflex to direct LDL Fasting  - UA Macroscopic with reflex to Microscopic and Culture  - UA Microscopic with Reflex to Culture    2. Mild episode of recurrent major depressive disorder (H)    3. Anxiety    4. Neoplasm of uncertain behavior of skin  - Adult Dermatology Referral; Future    5. Allergic rhinitis, unspecified seasonality, unspecified trigger    6. Neck Pain  - Comprehensive metabolic panel; Future  - Comprehensive metabolic panel    7. 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 Encounter for screening mammogram for breast cancer  - *MA Screening Digital Bilateral; Future    9. Special screening for malignant neoplasms, colon  - Adult GI  Referral - Procedure Only; Future    10. Family history of colon cancer  - Adult GI  Referral - Procedure Only; Future      Patient is a 42 year old female who is overall doing well.  Patient is overall feeling well.  She does have a history of anxiety and depression.  Currently she is not on any medications.  She has been on Prozac on and off throughout her lifetime.  The last time she was on Prozac it seemed to cause her diarrhea and so she stopped it and she seems to be doing well emotionally.  Please see PHQ-9 and NATALIE which are both completed in their entirety today.  She is not suicidal or homicidal.  She would like to continue off of all medication at this point but if that changes in the future she certainly should let me know.  She does have Vistaril at home that she uses maybe once every couple of months for anxiety and that seems to be doing fine.  She does not need a refill of that today she does have multiple lesions that she would like to be evaluated  by the dermatologist.  She has had lesions removed by the dermatologist in the past they told her to come back but she never did.  She does have a lesion now on her face that seems to be getting darker and she also has a mole on her back that needs to be reevaluated.  She also has lots of skin tags that she would like removed.  We will place referral to dermatology.  Someone should contact her regarding getting an appointment set up.  She has previously been to dermatology consultants.  She does have a history of allergies which seem to be seasonal.  She does have an inhaler that she uses when she needs it.  She needs it very infrequently.  She did check today and her inhaler is not .  We did talk with the fact that they do  so she needs to be careful to make sure that she gets a new one when it has .  She understood that.  She also has a history of neck pain and does use Naprosyn for that.  We will go ahead and check metabolic panel to follow-up on that.  She is also found that Naprosyn has been very helpful for her in terms of her dysmenorrhea.  She is legally blind in her right eye which has been something that she has had for many many years.  It does not seem like it is changing at all.  She is due for mammogram on 2023 and order was placed today.  She is also due for colonoscopy anytime after she turns 43.  She will contact her insurance regarding coverage to make sure that colonoscopy is covered at 43 and not at 48 but we will place the referral today for colonoscopy.  She does have a grandmother who had colon cancer at the age of 70 so she is quite concerned about colon cancer and would like to get her colonoscopy as soon as possible.  She did have COVID in 2023 and therefore declines COVID vaccination today.  She may get it at some point in the future.  She declines flu vaccination as well.  She did quit smoking on 7/15/2022 and is quite proud of that.  She does chew nicotine  gum occasionally but not on a consistent basis and she is not vaping.  Last Pap smear was 2/25/2022 and was not repeated today.  All of her questions and concerns were addressed today.  If she has additional problems or concerns should let me know.    Will contact her with the results of the labs when available.    Voice recognition software was used in the creation of this note. Any grammatical or nonsensical errors are due to inherent errors with the software and are not the intention of the writer.      Marissa Rico MD      Patient has been advised of split billing requirements and indicates understanding: Yes      COUNSELING:  Reviewed preventive health counseling, as reflected in patient instructions       Regular exercise       Healthy diet/nutrition       Colorectal Cancer Screening        She reports that she has quit smoking. Her smoking use included cigarettes. She has a 12.50 pack-year smoking history. She has been exposed to tobacco smoke. She has never used smokeless tobacco.  Nicotine/Tobacco Cessation Plan:   Patient has already quit smoking.  She quit on 7/15/2022.          Marissa Rico MD  [facility]  Answers for HPI/ROS submitted by the patient on 3/12/2023  NATALIE 7 TOTAL SCORE: 6

## 2023-03-14 LAB
ALBUMIN SERPL BCG-MCNC: 4.8 G/DL (ref 3.5–5.2)
ALP SERPL-CCNC: 60 U/L (ref 35–104)
ALT SERPL W P-5'-P-CCNC: 16 U/L (ref 10–35)
ANION GAP SERPL CALCULATED.3IONS-SCNC: 13 MMOL/L (ref 7–15)
AST SERPL W P-5'-P-CCNC: 16 U/L (ref 10–35)
BILIRUB SERPL-MCNC: 0.3 MG/DL
BUN SERPL-MCNC: 10.6 MG/DL (ref 6–20)
CALCIUM SERPL-MCNC: 9.6 MG/DL (ref 8.6–10)
CHLORIDE SERPL-SCNC: 104 MMOL/L (ref 98–107)
CHOLEST SERPL-MCNC: 232 MG/DL
CREAT SERPL-MCNC: 0.8 MG/DL (ref 0.51–0.95)
DEPRECATED HCO3 PLAS-SCNC: 23 MMOL/L (ref 22–29)
GFR SERPL CREATININE-BSD FRML MDRD: >90 ML/MIN/1.73M2
GLUCOSE SERPL-MCNC: 59 MG/DL (ref 70–99)
HDLC SERPL-MCNC: 54 MG/DL
LDLC SERPL CALC-MCNC: 144 MG/DL
NONHDLC SERPL-MCNC: 178 MG/DL
POTASSIUM SERPL-SCNC: 3.8 MMOL/L (ref 3.4–5.3)
PROT SERPL-MCNC: 7.4 G/DL (ref 6.4–8.3)
SODIUM SERPL-SCNC: 140 MMOL/L (ref 136–145)
TRIGL SERPL-MCNC: 169 MG/DL

## 2023-05-22 ENCOUNTER — HOSPITAL ENCOUNTER (OUTPATIENT)
Dept: MAMMOGRAPHY | Facility: CLINIC | Age: 43
Discharge: HOME | End: 2023-05-22
Attending: FAMILY MEDICINE | Admitting: FAMILY MEDICINE
Payer: COMMERCIAL

## 2023-05-22 DIAGNOSIS — Z12.31 ENCOUNTER FOR SCREENING MAMMOGRAM FOR BREAST CANCER: ICD-10-CM

## 2023-05-22 PROCEDURE — 77067 SCR MAMMO BI INCL CAD: CPT

## 2023-06-05 ENCOUNTER — TRANSFERRED RECORDS (OUTPATIENT)
Dept: HEALTH INFORMATION MANAGEMENT | Facility: CLINIC | Age: 43
End: 2023-06-05
Payer: COMMERCIAL

## 2023-07-03 ENCOUNTER — TELEPHONE (OUTPATIENT)
Dept: FAMILY MEDICINE | Facility: CLINIC | Age: 43
End: 2023-07-03
Payer: COMMERCIAL

## 2023-07-21 ENCOUNTER — TELEPHONE (OUTPATIENT)
Dept: FAMILY MEDICINE | Facility: CLINIC | Age: 43
End: 2023-07-21
Payer: COMMERCIAL

## 2023-07-21 NOTE — TELEPHONE ENCOUNTER
Patient Quality Outreach    Patient is due for the following:   Depression  -  PHQ-9 needed and PHQ-A needed    Next Steps:   Patient was assigned appropriate questionnaire to complete    Type of outreach:    Sent GENELINK message.    Next Steps:  Reach out within 90 days via Phone.    Max number of attempts reached: No. Will try again in 90 days if patient still on fail list.    Questions for provider review:    None           Love Hensley MA

## 2023-07-27 NOTE — TELEPHONE ENCOUNTER
PHQ-9 score:        7/21/2023     2:01 PM   PHQ   PHQ-9 Total Score 8   Q9: Thoughts of better off dead/self-harm past 2 weeks Not at all       Updated phq. Please advise if any action is needed.  Love Hensley ma

## 2023-07-27 NOTE — TELEPHONE ENCOUNTER
9/12/2022     8:46 AM 3/13/2023     4:48 PM 7/21/2023     2:01 PM   PHQ-9 SCORE   PHQ-9 Total Score MyChart 11 (Moderate depression) 6 (Mild depression) 8 (Mild depression)   PHQ-9 Total Score 11 6 8         2/25/2022     1:34 PM 9/6/2022    12:39 PM 3/12/2023     5:39 PM   NATALIE-7 SCORE   Total Score 6 (mild anxiety) 21 (severe anxiety) 6 (mild anxiety)   Total Score 6 21 6     Reviewed chart.  Has had recent mental health evaluation with no significant change in PHQ-9 since then.  No further follow-up warranted at this time

## 2023-08-10 ENCOUNTER — VIRTUAL VISIT (OUTPATIENT)
Dept: FAMILY MEDICINE | Facility: CLINIC | Age: 43
End: 2023-08-10
Payer: COMMERCIAL

## 2023-08-10 DIAGNOSIS — J30.9 ALLERGIC RHINITIS, UNSPECIFIED SEASONALITY, UNSPECIFIED TRIGGER: ICD-10-CM

## 2023-08-10 PROCEDURE — 99213 OFFICE O/P EST LOW 20 MIN: CPT | Mod: VID | Performed by: NURSE PRACTITIONER

## 2023-08-10 RX ORDER — ALBUTEROL SULFATE 90 UG/1
2 AEROSOL, METERED RESPIRATORY (INHALATION) EVERY 6 HOURS
Qty: 18 G | Refills: 4 | Status: SHIPPED | OUTPATIENT
Start: 2023-08-10

## 2023-08-10 NOTE — PROGRESS NOTES
Soraida is a 43 year old who is being evaluated via a billable video visit.      How would you like to obtain your AVS? MyChart  If the video visit is dropped, the invitation should be resent by: Text to cell phone: [phone]  Will anyone else be joining your video visit? No        Subjective   Soraida is a 43 year old, presenting for the following health issues:  Establish Care        3/13/2023     5:00 PM   Additional Questions   Roomed by Terra NEWELL LPN       History of Present Illness       Reason for visit:  Establish care with new Primary care Physician    She eats 2-3 servings of fruits and vegetables daily.She consumes 0 sweetened beverage(s) daily.She exercises with enough effort to increase her heart rate 10 to 19 minutes per day.  She exercises with enough effort to increase her heart rate 3 or less days per week.   She is taking medications regularly.       Review of Systems   Constitutional, HEENT, cardiovascular, pulmonary, gi and gu systems are negative, except as otherwise noted.  Uses albuterol inhaler occasionally- when allergies flare she occasionally has wheezing.       Objective         Vitals:  No vitals were obtained today due to virtual visit.    Physical Exam   GENERAL: Healthy, alert and no distress  EYES: Eyes grossly normal to inspection.  No discharge or erythema, or obvious scleral/conjunctival abnormalities.  RESP: No audible wheeze, cough, or visible cyanosis.  No visible retractions or increased work of breathing.    SKIN: Visible skin clear. No significant rash, abnormal pigmentation or lesions.  NEURO: Cranial nerves grossly intact.  Mentation and speech appropriate for age.  PSYCH: Mentation appears normal, affect normal/bright, judgement and insight intact, normal speech and appearance well-groomed.      A/P:  (J30.9) Allergic rhinitis, unspecified seasonality, unspecified trigger  Comment: Stable  Plan: albuterol (PROAIR HFA/PROVENTIL HFA/VENTOLIN         HFA) 108 (90 Base)  MCG/ACT inhaler              Video-Visit Details    Type of service:  Video Visit     Originating Location (pt. Location): Home    Distant Location (provider location):  On-site  Platform used for Video Visit: Gustavo

## 2023-11-28 ASSESSMENT — ANXIETY QUESTIONNAIRES
4. TROUBLE RELAXING: NEARLY EVERY DAY
8. IF YOU CHECKED OFF ANY PROBLEMS, HOW DIFFICULT HAVE THESE MADE IT FOR YOU TO DO YOUR WORK, TAKE CARE OF THINGS AT HOME, OR GET ALONG WITH OTHER PEOPLE?: SOMEWHAT DIFFICULT
IF YOU CHECKED OFF ANY PROBLEMS ON THIS QUESTIONNAIRE, HOW DIFFICULT HAVE THESE PROBLEMS MADE IT FOR YOU TO DO YOUR WORK, TAKE CARE OF THINGS AT HOME, OR GET ALONG WITH OTHER PEOPLE: SOMEWHAT DIFFICULT
5. BEING SO RESTLESS THAT IT IS HARD TO SIT STILL: SEVERAL DAYS
3. WORRYING TOO MUCH ABOUT DIFFERENT THINGS: NEARLY EVERY DAY
7. FEELING AFRAID AS IF SOMETHING AWFUL MIGHT HAPPEN: SEVERAL DAYS
7. FEELING AFRAID AS IF SOMETHING AWFUL MIGHT HAPPEN: SEVERAL DAYS
GAD7 TOTAL SCORE: 15
GAD7 TOTAL SCORE: 15
6. BECOMING EASILY ANNOYED OR IRRITABLE: MORE THAN HALF THE DAYS
2. NOT BEING ABLE TO STOP OR CONTROL WORRYING: NEARLY EVERY DAY
1. FEELING NERVOUS, ANXIOUS, OR ON EDGE: MORE THAN HALF THE DAYS

## 2023-12-01 ENCOUNTER — VIRTUAL VISIT (OUTPATIENT)
Dept: FAMILY MEDICINE | Facility: CLINIC | Age: 43
End: 2023-12-01
Payer: COMMERCIAL

## 2023-12-01 DIAGNOSIS — F41.1 ANXIETY STATE: ICD-10-CM

## 2023-12-01 DIAGNOSIS — F33.1 MODERATE EPISODE OF RECURRENT MAJOR DEPRESSIVE DISORDER (H): Primary | ICD-10-CM

## 2023-12-01 PROCEDURE — 99213 OFFICE O/P EST LOW 20 MIN: CPT | Mod: VID | Performed by: NURSE PRACTITIONER

## 2023-12-01 RX ORDER — ESCITALOPRAM OXALATE 10 MG/1
10 TABLET ORAL DAILY
Qty: 30 TABLET | Refills: 1 | Status: SHIPPED | OUTPATIENT
Start: 2023-12-01 | End: 2024-01-03 | Stop reason: DRUGHIGH

## 2023-12-01 ASSESSMENT — PATIENT HEALTH QUESTIONNAIRE - PHQ9
10. IF YOU CHECKED OFF ANY PROBLEMS, HOW DIFFICULT HAVE THESE PROBLEMS MADE IT FOR YOU TO DO YOUR WORK, TAKE CARE OF THINGS AT HOME, OR GET ALONG WITH OTHER PEOPLE: SOMEWHAT DIFFICULT
SUM OF ALL RESPONSES TO PHQ QUESTIONS 1-9: 14
SUM OF ALL RESPONSES TO PHQ QUESTIONS 1-9: 14

## 2023-12-01 NOTE — PROGRESS NOTES
Soraida is a 43 year old who is being evaluated via a billable video visit.      How would you like to obtain your AVS? MyChart  If the video visit is dropped, the invitation should be resent by: Text to cell phone: [phone]  Will anyone else be joining your video visit? No        Subjective   Soraida is a 43 year old, presenting for the following health issues:  Mental Health Problem (Would like to discuss medications for mental health. Has taken prozac 20 MG before but had some side effects that caused her to stop taking. Would like to try something similar like wellbutrin. )      12/1/2023     3:17 PM   Additional Questions   Roomed by Alaina VERONICA CMA       Mental Health Problem    History of Present Illness       Mental Health Follow-up:  Patient presents to follow-up on Depression & Anxiety.Patient's depression since last visit has been:  Medium  The patient is not having other symptoms associated with depression.  Patient's anxiety since last visit has been:  Medium  The patient is not having other symptoms associated with anxiety.  Any significant life events: relationship concerns, housing concerns and other  Patient is feeling anxious or having panic attacks.  Patient has no concerns about alcohol or drug use.    She eats 0-1 servings of fruits and vegetables daily.She consumes 1 sweetened beverage(s) daily.She exercises with enough effort to increase her heart rate 10 to 19 minutes per day.  She exercises with enough effort to increase her heart rate 3 or less days per week.   She is taking medications regularly.       Depression and Anxiety Follow-Up  How are you doing with your depression since your last visit? Worsened   How are you doing with your anxiety since your last visit?  Worsened   Are you having other symptoms that might be associated with depression or anxiety? Yes:  fatigue  Have you had a significant life event? OTHER: concerns with son, life changes    Do you have any concerns with your  use of alcohol or other drugs? No    Social History     Tobacco Use    Smoking status: Some Days     Packs/day: 0.50     Years: 25.00     Additional pack years: 0.00     Total pack years: 12.50     Types: Cigarettes     Passive exposure: Past    Smokeless tobacco: Never    Tobacco comments:     Quit July 2022. Pt has 1-2 cigarettes at social gatherings.    Vaping Use    Vaping Use: Never used   Substance Use Topics    Alcohol use: Yes     Alcohol/week: 1.0 standard drink of alcohol     Types: 1 Standard drinks or equivalent per week     Comment: Alcoholic Drinks/day: once a month    Drug use: No         7/21/2023     2:01 PM 12/1/2023     3:18 PM 12/1/2023     3:23 PM   PHQ   PHQ-9 Total Score 8 14 14   Q9: Thoughts of better off dead/self-harm past 2 weeks Not at all Not at all Not at all         9/6/2022    12:39 PM 3/12/2023     5:39 PM 11/28/2023    10:07 AM   NATALIE-7 SCORE   Total Score 21 (severe anxiety) 6 (mild anxiety) 15 (severe anxiety)   Total Score 21 6 15         12/1/2023     3:23 PM   Last PHQ-9   1.  Little interest or pleasure in doing things 2   2.  Feeling down, depressed, or hopeless 2   3.  Trouble falling or staying asleep, or sleeping too much 3   4.  Feeling tired or having little energy 3   5.  Poor appetite or overeating 2   6.  Feeling bad about yourself 0   7.  Trouble concentrating 1   8.  Moving slowly or restless 1   Q9: Thoughts of better off dead/self-harm past 2 weeks 0   PHQ-9 Total Score 14         11/28/2023    10:07 AM   NATALIE-7    1. Feeling nervous, anxious, or on edge 2   2. Not being able to stop or control worrying 3   3. Worrying too much about different things 3   4. Trouble relaxing 3   5. Being so restless that it is hard to sit still 1   6. Becoming easily annoyed or irritable 2   7. Feeling afraid, as if something awful might happen 1   NATALIE-7 Total Score 15   If you checked any problems, how difficult have they made it for you to do your work, take care of things at  "home, or get along with other people? Somewhat difficult       Suicide Assessment Five-step Evaluation and Treatment (SAFE-T)      Review of Systems   Constitutional, HEENT, cardiovascular, pulmonary, gi and gu systems are negative, except as otherwise noted.      Objective    Vitals - Patient Reported  Weight (Patient Reported): 83.9 kg (185 lb)  Height (Patient Reported): 172.1 cm (5' 7.75\")  BMI (Based on Pt Reported Ht/Wt): 28.34        Physical Exam   GENERAL: Healthy, alert and no distress  EYES: Eyes grossly normal to inspection.  No discharge or erythema, or obvious scleral/conjunctival abnormalities.  RESP: No audible wheeze, cough, or visible cyanosis.  No visible retractions or increased work of breathing.    SKIN: Visible skin clear. No significant rash, abnormal pigmentation or lesions.  NEURO: Cranial nerves grossly intact.  Mentation and speech appropriate for age.  PSYCH: Mentation appears normal, affect normal/bright, judgement and insight intact, normal speech and appearance well-groomed.    A/P:  (F33.1) Moderate episode of recurrent major depressive disorder (H)  (primary encounter diagnosis)  Comment:   Plan: escitalopram (LEXAPRO) 10 MG tablet            (F41.1) Anxiety state  Comment:   Plan: escitalopram (LEXAPRO) 10 MG tablet          Follow-up in 1 month.             Video-Visit Details    Type of service:  Video Visit     Originating Location (pt. Location): Home    Distant Location (provider location):  On-site  Platform used for Video Visit: Gustavo"

## 2024-01-02 ASSESSMENT — ANXIETY QUESTIONNAIRES
2. NOT BEING ABLE TO STOP OR CONTROL WORRYING: MORE THAN HALF THE DAYS
1. FEELING NERVOUS, ANXIOUS, OR ON EDGE: SEVERAL DAYS
6. BECOMING EASILY ANNOYED OR IRRITABLE: SEVERAL DAYS
4. TROUBLE RELAXING: SEVERAL DAYS
3. WORRYING TOO MUCH ABOUT DIFFERENT THINGS: MORE THAN HALF THE DAYS
7. FEELING AFRAID AS IF SOMETHING AWFUL MIGHT HAPPEN: SEVERAL DAYS
GAD7 TOTAL SCORE: 9
IF YOU CHECKED OFF ANY PROBLEMS ON THIS QUESTIONNAIRE, HOW DIFFICULT HAVE THESE PROBLEMS MADE IT FOR YOU TO DO YOUR WORK, TAKE CARE OF THINGS AT HOME, OR GET ALONG WITH OTHER PEOPLE: SOMEWHAT DIFFICULT
7. FEELING AFRAID AS IF SOMETHING AWFUL MIGHT HAPPEN: SEVERAL DAYS
5. BEING SO RESTLESS THAT IT IS HARD TO SIT STILL: SEVERAL DAYS
8. IF YOU CHECKED OFF ANY PROBLEMS, HOW DIFFICULT HAVE THESE MADE IT FOR YOU TO DO YOUR WORK, TAKE CARE OF THINGS AT HOME, OR GET ALONG WITH OTHER PEOPLE?: SOMEWHAT DIFFICULT
GAD7 TOTAL SCORE: 9

## 2024-01-03 ENCOUNTER — VIRTUAL VISIT (OUTPATIENT)
Dept: FAMILY MEDICINE | Facility: CLINIC | Age: 44
End: 2024-01-03
Attending: NURSE PRACTITIONER
Payer: COMMERCIAL

## 2024-01-03 DIAGNOSIS — F33.1 MODERATE EPISODE OF RECURRENT MAJOR DEPRESSIVE DISORDER (H): Primary | ICD-10-CM

## 2024-01-03 DIAGNOSIS — F41.1 ANXIETY STATE: ICD-10-CM

## 2024-01-03 PROCEDURE — 99213 OFFICE O/P EST LOW 20 MIN: CPT | Mod: 95 | Performed by: NURSE PRACTITIONER

## 2024-01-03 RX ORDER — ESCITALOPRAM OXALATE 20 MG/1
20 TABLET ORAL DAILY
Qty: 90 TABLET | Refills: 1 | Status: SHIPPED | OUTPATIENT
Start: 2024-01-03 | End: 2024-08-28

## 2024-01-03 ASSESSMENT — PATIENT HEALTH QUESTIONNAIRE - PHQ9: SUM OF ALL RESPONSES TO PHQ QUESTIONS 1-9: 8

## 2024-01-03 NOTE — PROGRESS NOTES
Soraida is a 43 year old who is being evaluated via a billable video visit.      How would you like to obtain your AVS? MyChart  If the video visit is dropped, the invitation should be resent by: Text to cell phone: [phone]  Will anyone else be joining your video visit? No        Subjective   Soraida is a 43 year old, presenting for the following health issues:  RECHECK (New meds maybe increase dose)      1/3/2024     4:24 PM   Additional Questions   Roomed by keenan frost cma       History of Present Illness       Mental Health Follow-up:  Patient presents to follow-up on Depression & Anxiety.Patient's depression since last visit has been:  Better  The patient is not having other symptoms associated with depression.  Patient's anxiety since last visit has been:  Better  The patient is not having other symptoms associated with anxiety.  Any significant life events: No  Patient is feeling anxious or having panic attacks.  Patient has no concerns about alcohol or drug use.    She eats 0-1 servings of fruits and vegetables daily.She consumes 0 sweetened beverage(s) daily.She exercises with enough effort to increase her heart rate 9 or less minutes per day.  She exercises with enough effort to increase her heart rate 3 or less days per week.   She is taking medications regularly.       Depression and Anxiety Follow-Up  How are you doing with your depression since your last visit? Improved   How are you doing with your anxiety since your last visit?  Improved   Are you having other symptoms that might be associated with depression or anxiety? No  Have you had a significant life event? No   Do you have any concerns with your use of alcohol or other drugs? No    Social History     Tobacco Use    Smoking status: Some Days     Packs/day: 0.50     Years: 25.00     Additional pack years: 0.00     Total pack years: 12.50     Types: Cigarettes     Passive exposure: Past    Smokeless tobacco: Never    Tobacco comments:     Quit July  2022. Pt has 1-2 cigarettes at social gatherings.    Vaping Use    Vaping Use: Never used   Substance Use Topics    Alcohol use: Yes     Alcohol/week: 1.0 standard drink of alcohol     Types: 1 Standard drinks or equivalent per week     Comment: Alcoholic Drinks/day: once a month    Drug use: No         12/1/2023     3:18 PM 12/1/2023     3:23 PM 1/3/2024     4:25 PM   PHQ   PHQ-9 Total Score 14 14 8   Q9: Thoughts of better off dead/self-harm past 2 weeks Not at all Not at all Not at all         3/12/2023     5:39 PM 11/28/2023    10:07 AM 1/2/2024     9:38 AM   NATALIE-7 SCORE   Total Score 6 (mild anxiety) 15 (severe anxiety) 9 (mild anxiety)   Total Score 6 15 9         1/3/2024     4:25 PM   Last PHQ-9   1.  Little interest or pleasure in doing things 1   2.  Feeling down, depressed, or hopeless 1   3.  Trouble falling or staying asleep, or sleeping too much 2   4.  Feeling tired or having little energy 2   5.  Poor appetite or overeating 1   6.  Feeling bad about yourself 0   7.  Trouble concentrating 1   8.  Moving slowly or restless 0   Q9: Thoughts of better off dead/self-harm past 2 weeks 0   PHQ-9 Total Score 8   Difficulty at work, home, or with people Somewhat difficult         1/2/2024     9:38 AM   NATALIE-7    1. Feeling nervous, anxious, or on edge 1   2. Not being able to stop or control worrying 2   3. Worrying too much about different things 2   4. Trouble relaxing 1   5. Being so restless that it is hard to sit still 1   6. Becoming easily annoyed or irritable 1   7. Feeling afraid, as if something awful might happen 1   NATALIE-7 Total Score 9   If you checked any problems, how difficult have they made it for you to do your work, take care of things at home, or get along with other people? Somewhat difficult       Suicide Assessment Five-step Evaluation and Treatment (SAFE-T)      Review of Systems   Constitutional, HEENT, cardiovascular, pulmonary, gi and gu systems are negative, except as otherwise  noted.    Anxiety and depression symptoms improved on Lexapro.  No side effects noted.  Would like to increase the dose.     Objective           Vitals:  No vitals were obtained today due to virtual visit.    Physical Exam   GENERAL: Healthy, alert and no distress  EYES: Eyes grossly normal to inspection.  No discharge or erythema, or obvious scleral/conjunctival abnormalities.  RESP: No audible wheeze, cough, or visible cyanosis.  No visible retractions or increased work of breathing.    SKIN: Visible skin clear. No significant rash, abnormal pigmentation or lesions.  NEURO: Cranial nerves grossly intact.  Mentation and speech appropriate for age.  PSYCH: Mentation appears normal, affect normal/bright, judgement and insight intact, normal speech and appearance well-groomed.        A/P:  (F33.1) Moderate episode of recurrent major depressive disorder (H)  (primary encounter diagnosis)  Comment:   Plan: escitalopram (LEXAPRO) 20 MG tablet            (F41.1) Anxiety state  Comment:   Plan: escitalopram (LEXAPRO) 20 MG tablet              Video-Visit Details    Type of service:  Video Visit     Originating Location (pt. Location): Home    Distant Location (provider location):  On-site  Platform used for Video Visit: NathanaelWell

## 2024-06-16 ENCOUNTER — HEALTH MAINTENANCE LETTER (OUTPATIENT)
Age: 44
End: 2024-06-16

## 2024-06-18 ENCOUNTER — HOSPITAL ENCOUNTER (OUTPATIENT)
Dept: MAMMOGRAPHY | Facility: CLINIC | Age: 44
Discharge: HOME | End: 2024-06-18
Attending: NURSE PRACTITIONER | Admitting: NURSE PRACTITIONER
Payer: COMMERCIAL

## 2024-06-18 DIAGNOSIS — Z12.31 VISIT FOR SCREENING MAMMOGRAM: ICD-10-CM

## 2024-06-18 PROCEDURE — 77063 BREAST TOMOSYNTHESIS BI: CPT

## 2024-08-28 ENCOUNTER — MYC REFILL (OUTPATIENT)
Dept: FAMILY MEDICINE | Facility: CLINIC | Age: 44
End: 2024-08-28
Payer: COMMERCIAL

## 2024-08-28 DIAGNOSIS — F33.1 MODERATE EPISODE OF RECURRENT MAJOR DEPRESSIVE DISORDER (H): ICD-10-CM

## 2024-08-28 DIAGNOSIS — F41.1 ANXIETY STATE: ICD-10-CM

## 2024-08-28 RX ORDER — ESCITALOPRAM OXALATE 20 MG/1
20 TABLET ORAL DAILY
Qty: 90 TABLET | Refills: 1 | Status: SHIPPED | OUTPATIENT
Start: 2024-08-28

## 2024-08-28 NOTE — LETTER
My Depression Action Plan  Name: Soraida Rousseau   Date of Birth 1980  Date: 8/28/2024    My doctor: Tawnya Mariscal   My clinic: [facility]  [address], [address]  [place]  [place] [facility] [address]  [phone]            GREEN    ZONE   Good Control    What it looks like:   Things are going generally well. You have normal ups and downs. You may even feel depressed from time to time, but bad moods usually last less than a day.   What you need to do:  Continue to care for yourself (see self care plan)  Check your depression survival kit and update it as needed  Follow your physician s recommendations including any medication.  Do not stop taking medication unless you consult with your physician first.             YELLOW         ZONE Getting Worse    What it looks like:   Depression is starting to interfere with your life.   It may be hard to get out of bed; you may be starting to isolate yourself from others.  Symptoms of depression are starting to last most all day and this has happened for several days.   You may have suicidal thoughts but they are not constant.   What you need to do:     Call your care team. Your response to treatment will improve if you keep your care team informed of your progress. Yellow periods are signs an adjustment may need to be made.     Continue your self-care.  Just get dressed and ready for the day.  Don't give yourself time to talk yourself out of it.    Talk to someone in your support network.    Open up your Depression Self-Care Plan/Wellness Kit.             RED    ZONE Medical Alert - Get Help    What it looks like:   Depression is seriously interfering with your life.   You may experience these or other symptoms: You can t get out of bed most days, can t work or engage in other necessary activities, you have trouble taking care of basic hygiene, or basic responsibilities, thoughts of suicide or death that  will not go away, self-injurious behavior.     What you need to do:  Call your care team and request a same-day appointment. If they are not available (weekends or after hours) call your local crisis line, emergency room or 911.          Depression Self-Care Plan / Wellness Kit    Many people find that medication and therapy are helpful treatments for managing depression. In addition, making small changes to your everyday life can help to boost your mood and improve your wellbeing. Below are some tips for you to consider. Be sure to talk with your medical provider and/or behavioral health consultant if your symptoms are worsening or not improving.     Sleep   Sleep hygiene  means all of the habits that support good, restful sleep. It includes maintaining a consistent bedtime and wake time, using your bedroom only for sleeping or sex, and keeping the bedroom dark and free of distractions like a computer, smartphone, or television.     Develop a Healthy Routine  Maintain good hygiene. Get out of bed in the morning, make your bed, brush your teeth, take a shower, and get dressed. Don t spend too much time viewing media that makes you feel stressed. Find time to relax each day.    Exercise  Get some form of exercise every day. This will help reduce pain and release endorphins, the  feel good  chemicals in your brain. It can be as simple as just going for a walk or doing some gardening, anything that will get you moving.      Diet  Strive to eat healthy foods, including fruits and vegetables. Drink plenty of water. Avoid excessive sugar, caffeine, alcohol, and other mood-altering substances.     Stay Connected with Others  Stay in touch with friends and family members.    Manage Your Mood  Try deep breathing, massage therapy, biofeedback, or meditation. Take part in fun activities when you can. Try to find something to smile about each day.     Psychotherapy  Be open to working with a therapist if your provider  recommends it.     Medication  Be sure to take your medication as prescribed. Most anti-depressants need to be taken every day. It usually takes several weeks for medications to work. Not all medicines work for all people. It is important to follow-up with your provider to make sure you have a treatment plan that is working for you. Do not stop your medication abruptly without first discussing it with your provider.    Crisis Resources   These hotlines are for both adults and children. They and are open 24 hours a day, 7 days a week unless noted otherwise.    National Suicide Prevention Lifeline   988 or 1-500-961-ETKG (9250)    Crisis Text Line    www.crisistextline.org  Text HOME to 236596 from anywhere in the [place], anytime, about any type of crisis. A live, trained crisis counselor will receive the text and respond quickly.    [place] Lifeline for LGBTQ Youth  A national crisis intervention and suicide lifeline for LGBTQ youth under 25. Provides a safe place to talk without judgement. Call 1-717.833.1320; text START to 716576 or visit www.thetrevorproject.org to talk to a trained counselor.    For [facility] crisis numbers, visit the [facility] website at:  https://[place].gov/dhs/people-we-serve/adults/health-care/mental-health/resources/crisis-contacts.jsp

## 2024-09-09 ENCOUNTER — PATIENT OUTREACH (OUTPATIENT)
Dept: CARE COORDINATION | Facility: CLINIC | Age: 44
End: 2024-09-09
Payer: COMMERCIAL

## 2025-07-15 ENCOUNTER — HOSPITAL ENCOUNTER (OUTPATIENT)
Dept: MAMMOGRAPHY | Facility: CLINIC | Age: 45
Discharge: HOME | End: 2025-07-15
Attending: NURSE PRACTITIONER
Payer: COMMERCIAL

## 2025-07-15 DIAGNOSIS — Z12.31 VISIT FOR SCREENING MAMMOGRAM: ICD-10-CM

## 2025-07-15 PROCEDURE — 77063 BREAST TOMOSYNTHESIS BI: CPT
